# Patient Record
Sex: FEMALE | Race: WHITE | NOT HISPANIC OR LATINO | Employment: OTHER | URBAN - NONMETROPOLITAN AREA
[De-identification: names, ages, dates, MRNs, and addresses within clinical notes are randomized per-mention and may not be internally consistent; named-entity substitution may affect disease eponyms.]

---

## 2017-03-03 RX ORDER — CLOPIDOGREL BISULFATE 75 MG/1
TABLET ORAL
Qty: 30 TABLET | Refills: 6 | Status: SHIPPED | OUTPATIENT
Start: 2017-03-03 | End: 2017-09-17 | Stop reason: SDUPTHER

## 2017-04-12 ENCOUNTER — OFFICE VISIT (OUTPATIENT)
Dept: FAMILY MEDICINE CLINIC | Facility: CLINIC | Age: 77
End: 2017-04-12

## 2017-04-12 ENCOUNTER — APPOINTMENT (OUTPATIENT)
Dept: LAB | Facility: HOSPITAL | Age: 77
End: 2017-04-12

## 2017-04-12 VITALS
HEART RATE: 61 BPM | WEIGHT: 118 LBS | DIASTOLIC BLOOD PRESSURE: 72 MMHG | SYSTOLIC BLOOD PRESSURE: 122 MMHG | OXYGEN SATURATION: 100 % | BODY MASS INDEX: 23.05 KG/M2

## 2017-04-12 DIAGNOSIS — M25.572 CHRONIC PAIN OF BOTH ANKLES: ICD-10-CM

## 2017-04-12 DIAGNOSIS — M79.671 PAIN IN BOTH FEET: ICD-10-CM

## 2017-04-12 DIAGNOSIS — M79.672 PAIN IN BOTH FEET: ICD-10-CM

## 2017-04-12 DIAGNOSIS — M25.571 CHRONIC PAIN OF BOTH ANKLES: ICD-10-CM

## 2017-04-12 DIAGNOSIS — G89.29 CHRONIC PAIN OF BOTH ANKLES: ICD-10-CM

## 2017-04-12 DIAGNOSIS — E78.00 PURE HYPERCHOLESTEROLEMIA: ICD-10-CM

## 2017-04-12 DIAGNOSIS — I15.0 RENOVASCULAR HYPERTENSION: ICD-10-CM

## 2017-04-12 DIAGNOSIS — Z13.820 SCREENING FOR OSTEOPOROSIS: ICD-10-CM

## 2017-04-12 DIAGNOSIS — M54.6 ACUTE RIGHT-SIDED THORACIC BACK PAIN: Primary | ICD-10-CM

## 2017-04-12 DIAGNOSIS — Z23 PNEUMOCOCCAL VACCINATION ADMINISTERED AT CURRENT VISIT: ICD-10-CM

## 2017-04-12 DIAGNOSIS — N18.30 CHRONIC KIDNEY DISEASE, STAGE 3 (HCC): ICD-10-CM

## 2017-04-12 LAB
ALBUMIN SERPL-MCNC: 4.3 G/DL (ref 3.4–4.8)
ALBUMIN/GLOB SERPL: 1.5 G/DL (ref 1.1–1.8)
ALP SERPL-CCNC: 98 U/L (ref 38–126)
ALT SERPL W P-5'-P-CCNC: 31 U/L (ref 9–52)
ANION GAP SERPL CALCULATED.3IONS-SCNC: 12 MMOL/L (ref 5–15)
ARTICHOKE IGE QN: 47 MG/DL (ref 1–129)
AST SERPL-CCNC: 76 U/L (ref 14–36)
BILIRUB SERPL-MCNC: 0.7 MG/DL (ref 0.2–1.3)
BUN BLD-MCNC: 24 MG/DL (ref 7–21)
BUN/CREAT SERPL: 19.7 (ref 7–25)
CALCIUM SPEC-SCNC: 9.6 MG/DL (ref 8.4–10.2)
CHLORIDE SERPL-SCNC: 100 MMOL/L (ref 95–110)
CHOLEST SERPL-MCNC: 149 MG/DL (ref 0–199)
CO2 SERPL-SCNC: 28 MMOL/L (ref 22–31)
CREAT BLD-MCNC: 1.22 MG/DL (ref 0.5–1)
GFR SERPL CREATININE-BSD FRML MDRD: 43 ML/MIN/1.73 (ref 39–90)
GLOBULIN UR ELPH-MCNC: 2.9 GM/DL (ref 2.3–3.5)
GLUCOSE BLD-MCNC: 106 MG/DL (ref 60–100)
HDLC SERPL-MCNC: 59 MG/DL (ref 60–200)
LDLC/HDLC SERPL: 1.12 {RATIO} (ref 0–3.22)
POTASSIUM BLD-SCNC: 3.7 MMOL/L (ref 3.5–5.1)
PROT SERPL-MCNC: 7.2 G/DL (ref 6.3–8.6)
SODIUM BLD-SCNC: 140 MMOL/L (ref 137–145)
TRIGL SERPL-MCNC: 119 MG/DL (ref 20–199)

## 2017-04-12 PROCEDURE — G0009 ADMIN PNEUMOCOCCAL VACCINE: HCPCS | Performed by: FAMILY MEDICINE

## 2017-04-12 PROCEDURE — 80053 COMPREHEN METABOLIC PANEL: CPT | Performed by: FAMILY MEDICINE

## 2017-04-12 PROCEDURE — 90732 PPSV23 VACC 2 YRS+ SUBQ/IM: CPT | Performed by: FAMILY MEDICINE

## 2017-04-12 PROCEDURE — 36415 COLL VENOUS BLD VENIPUNCTURE: CPT | Performed by: FAMILY MEDICINE

## 2017-04-12 PROCEDURE — 99214 OFFICE O/P EST MOD 30 MIN: CPT | Performed by: FAMILY MEDICINE

## 2017-04-12 PROCEDURE — 80061 LIPID PANEL: CPT | Performed by: FAMILY MEDICINE

## 2017-04-12 RX ORDER — TORSEMIDE 10 MG/1
10 TABLET ORAL DAILY
Refills: 1 | COMMUNITY
Start: 2017-03-20 | End: 2019-12-02 | Stop reason: ALTCHOICE

## 2017-04-12 RX ORDER — AMLODIPINE BESYLATE 10 MG/1
10 TABLET ORAL DAILY
Refills: 6 | COMMUNITY
Start: 2017-03-20 | End: 2018-05-29 | Stop reason: SDUPTHER

## 2017-04-12 NOTE — PROGRESS NOTES
Subjective   Ilda Leon is a 77 y.o. female.     Hypertension   This is a chronic problem. The problem is unchanged. The problem is controlled. Associated symptoms include headaches and peripheral edema. Pertinent negatives include no chest pain, palpitations, PND or shortness of breath.   Joint Swelling   This is a chronic problem. The current episode started more than 1 month ago. The problem occurs constantly. Associated symptoms include congestion and headaches. Pertinent negatives include no chest pain, coughing, myalgias or sore throat. Joint swelling: ankles bilaterally.   Ankle Pain      Chronic Kidney Disease   This is a chronic problem. The current episode started more than 1 year ago. The problem has been unchanged. Associated symptoms include congestion and headaches. Pertinent negatives include no chest pain, coughing, myalgias or sore throat. Joint swelling: ankles bilaterally.   URI    This is a recurrent problem. The current episode started 1 to 4 weeks ago. The problem has been rapidly worsening. There has been no fever. Associated symptoms include congestion, headaches, rhinorrhea and sinus pain. Pertinent negatives include no chest pain, coughing, ear pain, sore throat or wheezing.   Back Pain   This is a recurrent problem. The current episode started more than 1 month ago. The problem has been waxing and waning since onset. The pain is present in the thoracic spine. The quality of the pain is described as aching. The pain is at a severity of 10/10. The pain is severe. The pain is the same all the time. Associated symptoms include headaches. Pertinent negatives include no chest pain.        The following portions of the patient's history were reviewed and updated as appropriate: allergies, current medications, past family history, past medical history, past social history, past surgical history and problem list.    Review of Systems   HENT: Positive for congestion, ear discharge, facial  swelling, postnasal drip and rhinorrhea. Negative for ear pain, hearing loss and sore throat.    Respiratory: Negative for cough, shortness of breath and wheezing.    Cardiovascular: Negative for chest pain, palpitations and PND.   Musculoskeletal: Positive for back pain. Negative for myalgias. Joint swelling: ankles bilaterally.   Neurological: Positive for headaches.       Objective   Physical Exam   Constitutional: She is oriented to person, place, and time. She appears well-developed and well-nourished. No distress.   HENT:   Head: Normocephalic and atraumatic.   Right Ear: No lacerations. No drainage, swelling or tenderness. No foreign bodies. Tympanic membrane is not injected. No middle ear effusion.   Left Ear: No lacerations. No drainage, swelling or tenderness. No foreign bodies. Tympanic membrane is not injected.  No middle ear effusion. No decreased hearing is noted.   Cardiovascular: Normal rate, regular rhythm and normal heart sounds.  Exam reveals no gallop.    No murmur heard.  Pulmonary/Chest: Effort normal and breath sounds normal. No respiratory distress. She has no wheezes. She has no rales. She exhibits no tenderness.   Musculoskeletal: She exhibits edema.   Neurological: She is alert and oriented to person, place, and time.   Skin: Skin is warm and dry. She is not diaphoretic.   Psychiatric: She has a normal mood and affect. Her behavior is normal. Judgment and thought content normal.   Nursing note and vitals reviewed.      Assessment/Plan   Problems Addressed this Visit        Cardiovascular and Mediastinum    Hyperlipidemia    Relevant Orders    Lipid Panel    Renovascular hypertension    Relevant Medications    amLODIPine (NORVASC) 10 MG tablet    torsemide (DEMADEX) 10 MG tablet    Other Relevant Orders    Comprehensive Metabolic Panel       Nervous and Auditory    Pain in both feet    Relevant Orders    XR Foot 3+ View Right (In Office)    XR Foot 3+ View Left       Genitourinary     Chronic kidney disease, stage 3    Relevant Medications    torsemide (DEMADEX) 10 MG tablet       Other    Acute right-sided thoracic back pain - Primary    Relevant Orders    XR spine thoracic 3 vw    Chronic pain of both ankles    Relevant Orders    XR Ankle 3+ View Right    XR Ankle 3+ View Left

## 2017-04-13 ENCOUNTER — TELEPHONE (OUTPATIENT)
Dept: FAMILY MEDICINE CLINIC | Facility: CLINIC | Age: 77
End: 2017-04-13

## 2017-04-13 DIAGNOSIS — R74.01 ELEVATED AST (SGOT): Primary | ICD-10-CM

## 2017-04-13 NOTE — TELEPHONE ENCOUNTER
Called and spoke with patient.  Relayed all results and recommendations.  Pt stated understanding.  Orders for Hep Panel and liver US sent.

## 2017-04-13 NOTE — TELEPHONE ENCOUNTER
----- Message from Jorge Diana MD sent at 4/13/2017  4:01 PM CDT -----  Renal function stable.  Liver test are elevated.  Recommend a hepatitis panel and an ultrasound the liver.  Please arrange

## 2017-04-13 NOTE — PROGRESS NOTES
Renal function stable.  Liver test are elevated.  Recommend a hepatitis panel and an ultrasound the liver.  Please arrange

## 2017-04-19 ENCOUNTER — HOSPITAL ENCOUNTER (OUTPATIENT)
Dept: ULTRASOUND IMAGING | Facility: HOSPITAL | Age: 77
Discharge: HOME | End: 2017-04-19
Attending: FAMILY MEDICINE | Admitting: FAMILY MEDICINE

## 2017-04-19 DIAGNOSIS — R74.01 ELEVATED AST (SGOT): ICD-10-CM

## 2017-04-19 PROCEDURE — 76705 ECHO EXAM OF ABDOMEN: CPT

## 2017-04-20 ENCOUNTER — TELEPHONE (OUTPATIENT)
Dept: FAMILY MEDICINE CLINIC | Facility: CLINIC | Age: 77
End: 2017-04-20

## 2017-04-20 RX ORDER — ALENDRONATE SODIUM 70 MG/1
TABLET ORAL
Qty: 4 TABLET | Refills: 11 | Status: SHIPPED | OUTPATIENT
Start: 2017-04-20 | End: 2017-05-26 | Stop reason: ALTCHOICE

## 2017-04-20 RX ORDER — ALENDRONATE SODIUM 70 MG/1
70 TABLET ORAL
Qty: 4 TABLET | Refills: 11 | Status: SHIPPED | OUTPATIENT
Start: 2017-04-20 | End: 2017-04-20 | Stop reason: SDUPTHER

## 2017-04-20 NOTE — PROGRESS NOTES
Has osteoporosis.  Recommend Fosamax 70 mg 30 minutes before breakfast on an empty stomach with 8 ounces of water once a week.  He'll her not to lie down for 30 minutes after taking this medicine area. ASk if she has been  On fosamax before.

## 2017-04-20 NOTE — TELEPHONE ENCOUNTER
Pr Dr. Diana, Ms Leon has been called with her DEXA Scan Results & Recommendations  She has never taken Fosamax in the past.   Script sent to her pharmacy  Continue her other medications and follow-up as planned.       ----- Message from Jorge Diana MD sent at 4/20/2017  2:07 PM CDT -----  Has osteoporosis.  Recommend Fosamax 70 mg 30 minutes before breakfast on an empty stomach with 8 ounces of water once a week.  He'll her not to lie down for 30 minutes after taking this medicine area. ASk if she has been  On fosamax before.

## 2017-05-26 ENCOUNTER — OFFICE VISIT (OUTPATIENT)
Dept: PAIN MEDICINE | Facility: CLINIC | Age: 77
End: 2017-05-26

## 2017-05-26 VITALS
WEIGHT: 119.2 LBS | HEIGHT: 60 IN | SYSTOLIC BLOOD PRESSURE: 142 MMHG | DIASTOLIC BLOOD PRESSURE: 72 MMHG | BODY MASS INDEX: 23.4 KG/M2

## 2017-05-26 DIAGNOSIS — G50.0 TRIGEMINAL NEURALGIA OF RIGHT SIDE OF FACE: Primary | ICD-10-CM

## 2017-05-26 PROCEDURE — 99203 OFFICE O/P NEW LOW 30 MIN: CPT | Performed by: PAIN MEDICINE

## 2017-06-29 ENCOUNTER — OFFICE VISIT (OUTPATIENT)
Dept: OPHTHALMOLOGY | Facility: CLINIC | Age: 77
End: 2017-06-29

## 2017-06-29 DIAGNOSIS — Z96.1 PSEUDOPHAKIA: ICD-10-CM

## 2017-06-29 DIAGNOSIS — H25.012 CORTICAL AGE-RELATED CATARACT, LEFT: ICD-10-CM

## 2017-06-29 DIAGNOSIS — IMO0002 NUCLEAR CATARACT, LEFT: Primary | ICD-10-CM

## 2017-06-29 PROCEDURE — 99213 OFFICE O/P EST LOW 20 MIN: CPT | Performed by: OPHTHALMOLOGY

## 2017-06-29 NOTE — PROGRESS NOTES
Subjective   Ilda Leon is a 77 y.o. female.   Chief Complaint   Patient presents with   • Cataract   • Eye Exam   • Itchy Eye   • Pseudophakia     Right     HPI     Cataract   Laterality: left eye           Itchy Eye   Laterality: both eyes   Frequency: intermittently   Associated symptoms: tearing           Pseudophakia   Comments: Right       Last edited by Mitra Whiteside on 6/29/2017 10:59 AM. (History)          Review of Systems    Objective   Base Eye Exam     Visual Acuity (Snellen - Linear)      Right Left   Dist cc 20/30 20/50 -1   Near cc J2 J2       Correction:  Glasses      Tonometry (Applanation, 11:24 AM)      Right Left   Pressure 18 18         Pupils      Pupils   Right PERRL   Left PERRL         Visual Fields      Left Right   Result Full Full         Extraocular Movement      Right Left   Result Full Full         Dilation     Both eyes:  2.5% Dimitris Synephrine, 1.0% Mydriacyl @ 11:28 AM            Slit Lamp and Fundus Exam     External Exam      Right Left    External Normal Normal      Slit Lamp Exam      Right Left    Lids/Lashes Normal Normal    Conjunctiva/Sclera White and quiet White and quiet    Cornea Clear Clear    Anterior Chamber Deep and quiet Deep and quiet    Iris Round and reactive Round and reactive    Lens Posterior chamber intraocular lens 1+ Nuclear sclerosis, 1+ Cortical cataract    Vitreous Normal Normal      Fundus Exam      Right Left    Disc Normal Normal    Macula Normal Normal    Vessels Normal Normal    Periphery Normal Normal            Refraction     Wearing Rx      Sphere Cylinder Axis Add   Right -1.50 +1.75 178 +2.75   Left +0.25 +1.25 174 +2.75         Manifest Refraction      Sphere Cylinder Axis Dist Add   Right -1.50 +1.75 178 20/30 +2.75   Left +0.25 +1.25 174 20/40 +2.75         Final Rx      Sphere Cylinder Axis Add   Right -1.50 +1.75 178 +2.75   Left +0.25 +1.25 174 +2.75                   Assessment/Plan   Diagnoses and all orders for this  visit:    Nuclear cataract, left    Cortical age-related cataract, left    Pseudophakia      Plan:    Glasses Rx given per refraction  Recommend ophthalmology f/u one year/prn      No Follow-up on file.

## 2017-07-31 RX ORDER — LORATADINE 10 MG/1
TABLET ORAL
Qty: 10 TABLET | Refills: 2 | Status: SHIPPED | OUTPATIENT
Start: 2017-07-31 | End: 2017-08-28

## 2017-08-16 RX ORDER — ATORVASTATIN CALCIUM 10 MG/1
TABLET, FILM COATED ORAL
Qty: 90 TABLET | Refills: 2 | Status: SHIPPED | OUTPATIENT
Start: 2017-08-16 | End: 2018-05-13 | Stop reason: SDUPTHER

## 2017-08-28 ENCOUNTER — OFFICE VISIT (OUTPATIENT)
Dept: FAMILY MEDICINE CLINIC | Facility: CLINIC | Age: 77
End: 2017-08-28

## 2017-08-28 VITALS
OXYGEN SATURATION: 99 % | DIASTOLIC BLOOD PRESSURE: 70 MMHG | HEART RATE: 73 BPM | HEIGHT: 60 IN | BODY MASS INDEX: 23.75 KG/M2 | WEIGHT: 121 LBS | SYSTOLIC BLOOD PRESSURE: 122 MMHG

## 2017-08-28 DIAGNOSIS — I10 ESSENTIAL HYPERTENSION: ICD-10-CM

## 2017-08-28 DIAGNOSIS — M81.0 OSTEOPOROSIS: Primary | ICD-10-CM

## 2017-08-28 DIAGNOSIS — J30.2 SEASONAL ALLERGIC RHINITIS, UNSPECIFIED ALLERGIC RHINITIS TRIGGER: ICD-10-CM

## 2017-08-28 DIAGNOSIS — N18.30 CHRONIC KIDNEY DISEASE, STAGE 3 (HCC): ICD-10-CM

## 2017-08-28 PROCEDURE — 99214 OFFICE O/P EST MOD 30 MIN: CPT | Performed by: FAMILY MEDICINE

## 2017-08-28 RX ORDER — FLUTICASONE PROPIONATE 50 MCG
2 SPRAY, SUSPENSION (ML) NASAL DAILY
Qty: 1 EACH | Refills: 11 | Status: SHIPPED | OUTPATIENT
Start: 2017-08-28 | End: 2017-09-27

## 2017-08-28 NOTE — PROGRESS NOTES
Subjective   Ilda Leon is a 77 y.o. female.     Hypertension   This is a chronic problem. The problem is unchanged. The problem is controlled. Associated symptoms include peripheral edema. Pertinent negatives include no palpitations, PND or shortness of breath.   Chronic Kidney Disease   This is a chronic problem. The current episode started more than 1 year ago. The problem has been unchanged. Associated symptoms include congestion.   Back Pain   This is a recurrent problem. The current episode started more than 1 month ago. The problem has been waxing and waning since onset. The pain is present in the thoracic spine. The quality of the pain is described as aching. The pain is at a severity of 10/10. The pain is severe. The pain is the same all the time.   URI    This is a recurrent problem. The current episode started more than 1 month ago. The problem has been waxing and waning. There has been no fever. Associated symptoms include congestion and sneezing. Pertinent negatives include no rhinorrhea, sinus pain or wheezing.        The following portions of the patient's history were reviewed and updated as appropriate: allergies, current medications, past family history, past medical history, past social history, past surgical history and problem list.    Review of Systems   HENT: Positive for congestion, ear discharge, facial swelling, postnasal drip and sneezing. Negative for hearing loss and rhinorrhea.    Respiratory: Negative for shortness of breath and wheezing.    Cardiovascular: Negative for palpitations and PND.   Musculoskeletal: Positive for back pain.       Objective   Physical Exam   Constitutional: She is oriented to person, place, and time. She appears well-developed and well-nourished. No distress.   HENT:   Head: Normocephalic and atraumatic.   Right Ear: No lacerations. No drainage, swelling or tenderness. No foreign bodies. Tympanic membrane is not injected. No middle ear effusion.   Left  Ear: No lacerations. No drainage, swelling or tenderness. No foreign bodies. Tympanic membrane is not injected.  No middle ear effusion. No decreased hearing is noted.   Nose: Mucosal edema and rhinorrhea present.   Mouth/Throat: Uvula is midline, oropharynx is clear and moist and mucous membranes are normal.   Cardiovascular: Normal rate, regular rhythm and normal heart sounds.  Exam reveals no gallop.    No murmur heard.  Pulmonary/Chest: Effort normal and breath sounds normal. No respiratory distress. She has no wheezes. She has no rales. She exhibits no tenderness.   Musculoskeletal: She exhibits edema.   Neurological: She is alert and oriented to person, place, and time.   Skin: Skin is warm and dry. She is not diaphoretic.   Psychiatric: She has a normal mood and affect. Her behavior is normal. Judgment and thought content normal.   Nursing note and vitals reviewed.      Assessment/Plan   Problems Addressed this Visit        Musculoskeletal and Integument    Osteoporosis declined fosamax - Primary    Relevant Medications    denosumab (PROLIA) syringe 60 mg       Genitourinary    Chronic kidney disease, stage 3      Other Visit Diagnoses     Essential hypertension        Seasonal allergic rhinitis, unspecified allergic rhinitis trigger

## 2017-09-13 ENCOUNTER — INFUSION (OUTPATIENT)
Dept: ONCOLOGY | Facility: HOSPITAL | Age: 77
End: 2017-09-13

## 2017-09-13 DIAGNOSIS — M81.0 OSTEOPOROSIS: Primary | ICD-10-CM

## 2017-09-13 PROCEDURE — 96372 THER/PROPH/DIAG INJ SC/IM: CPT | Performed by: HOSPITALIST

## 2017-09-13 PROCEDURE — 25010000002 DENOSUMAB 60 MG/ML SOLUTION: Performed by: FAMILY MEDICINE

## 2017-09-13 RX ADMIN — DENOSUMAB 60 MG: 60 INJECTION SUBCUTANEOUS at 10:40

## 2017-09-18 RX ORDER — CLOPIDOGREL BISULFATE 75 MG/1
TABLET ORAL
Qty: 30 TABLET | Refills: 6 | Status: SHIPPED | OUTPATIENT
Start: 2017-09-18 | End: 2018-04-07 | Stop reason: SDUPTHER

## 2017-11-28 ENCOUNTER — OFFICE VISIT (OUTPATIENT)
Dept: FAMILY MEDICINE CLINIC | Facility: CLINIC | Age: 77
End: 2017-11-28

## 2017-11-28 ENCOUNTER — LAB (OUTPATIENT)
Dept: LAB | Facility: HOSPITAL | Age: 77
End: 2017-11-28

## 2017-11-28 VITALS
HEIGHT: 60 IN | SYSTOLIC BLOOD PRESSURE: 120 MMHG | BODY MASS INDEX: 23.71 KG/M2 | DIASTOLIC BLOOD PRESSURE: 70 MMHG | HEART RATE: 64 BPM | OXYGEN SATURATION: 99 % | WEIGHT: 120.8 LBS

## 2017-11-28 DIAGNOSIS — R74.01 ELEVATED AST (SGOT): ICD-10-CM

## 2017-11-28 DIAGNOSIS — E78.00 PURE HYPERCHOLESTEROLEMIA: ICD-10-CM

## 2017-11-28 DIAGNOSIS — J30.1 CHRONIC SEASONAL ALLERGIC RHINITIS DUE TO POLLEN: ICD-10-CM

## 2017-11-28 DIAGNOSIS — N18.30 CHRONIC KIDNEY DISEASE, STAGE 3 (HCC): ICD-10-CM

## 2017-11-28 DIAGNOSIS — I15.0 RENOVASCULAR HYPERTENSION: ICD-10-CM

## 2017-11-28 DIAGNOSIS — Z12.39 SCREENING FOR BREAST CANCER: Primary | ICD-10-CM

## 2017-11-28 LAB
ALBUMIN SERPL-MCNC: 4.3 G/DL (ref 3.4–4.8)
ALBUMIN/GLOB SERPL: 1.3 G/DL (ref 1.1–1.8)
ALP SERPL-CCNC: 80 U/L (ref 38–126)
ALT SERPL W P-5'-P-CCNC: 29 U/L (ref 9–52)
ANION GAP SERPL CALCULATED.3IONS-SCNC: 12 MMOL/L (ref 5–15)
ARTICHOKE IGE QN: 47 MG/DL (ref 1–129)
AST SERPL-CCNC: 64 U/L (ref 14–36)
BILIRUB SERPL-MCNC: 0.6 MG/DL (ref 0.2–1.3)
BUN BLD-MCNC: 24 MG/DL (ref 7–21)
BUN/CREAT SERPL: 17.5 (ref 7–25)
CALCIUM SPEC-SCNC: 9 MG/DL (ref 8.4–10.2)
CHLORIDE SERPL-SCNC: 102 MMOL/L (ref 95–110)
CHOLEST SERPL-MCNC: 131 MG/DL (ref 0–199)
CO2 SERPL-SCNC: 28 MMOL/L (ref 22–31)
CREAT BLD-MCNC: 1.37 MG/DL (ref 0.5–1)
GFR SERPL CREATININE-BSD FRML MDRD: 37 ML/MIN/1.73 (ref 39–90)
GLOBULIN UR ELPH-MCNC: 3.2 GM/DL (ref 2.3–3.5)
GLUCOSE BLD-MCNC: 100 MG/DL (ref 60–100)
HDLC SERPL-MCNC: 54 MG/DL (ref 60–200)
LDLC/HDLC SERPL: 1.04 {RATIO} (ref 0–3.22)
PHOSPHATE SERPL-MCNC: 2.9 MG/DL (ref 2.4–4.4)
POTASSIUM BLD-SCNC: 3.5 MMOL/L (ref 3.5–5.1)
PROT SERPL-MCNC: 7.5 G/DL (ref 6.3–8.6)
SODIUM BLD-SCNC: 142 MMOL/L (ref 137–145)
TRIGL SERPL-MCNC: 105 MG/DL (ref 20–199)

## 2017-11-28 PROCEDURE — 87340 HEPATITIS B SURFACE AG IA: CPT

## 2017-11-28 PROCEDURE — 86708 HEPATITIS A ANTIBODY: CPT

## 2017-11-28 PROCEDURE — 80061 LIPID PANEL: CPT | Performed by: FAMILY MEDICINE

## 2017-11-28 PROCEDURE — 86803 HEPATITIS C AB TEST: CPT

## 2017-11-28 PROCEDURE — 86706 HEP B SURFACE ANTIBODY: CPT

## 2017-11-28 PROCEDURE — 99214 OFFICE O/P EST MOD 30 MIN: CPT | Performed by: FAMILY MEDICINE

## 2017-11-28 PROCEDURE — 84100 ASSAY OF PHOSPHORUS: CPT

## 2017-11-28 PROCEDURE — 36415 COLL VENOUS BLD VENIPUNCTURE: CPT

## 2017-11-28 PROCEDURE — 80053 COMPREHEN METABOLIC PANEL: CPT | Performed by: FAMILY MEDICINE

## 2017-11-28 PROCEDURE — 86704 HEP B CORE ANTIBODY TOTAL: CPT

## 2017-11-28 NOTE — PROGRESS NOTES
Subjective   Ilda Leon is a 77 y.o. female.     Arthritis     Hypertension   This is a chronic problem. The current episode started more than 1 year ago. The problem is unchanged. The problem is controlled. Associated symptoms include peripheral edema. Pertinent negatives include no chest pain, orthopnea, palpitations, PND or shortness of breath.   Chronic Kidney Disease   This is a chronic problem. The current episode started more than 1 year ago. The problem has been unchanged. Associated symptoms include myalgias. Pertinent negatives include no chest pain.   Back Pain   This is a recurrent problem. The current episode started more than 1 month ago. The problem has been waxing and waning since onset. The pain is present in the thoracic spine. The quality of the pain is described as aching. The pain is at a severity of 10/10. The pain is severe. The pain is the same all the time. Pertinent negatives include no chest pain.   Hyperlipidemia   Associated symptoms include myalgias. Pertinent negatives include no chest pain or shortness of breath.        The following portions of the patient's history were reviewed and updated as appropriate: allergies, current medications, past family history, past medical history, past social history, past surgical history and problem list.    Review of Systems   HENT: Positive for postnasal drip.    Respiratory: Negative for shortness of breath.    Cardiovascular: Negative for chest pain, palpitations, orthopnea and PND.   Musculoskeletal: Positive for arthritis, back pain and myalgias.       Objective   Physical Exam   Constitutional: She is oriented to person, place, and time. She appears well-developed and well-nourished. No distress.   HENT:   Head: Normocephalic and atraumatic.   Right Ear: No lacerations. No drainage, swelling or tenderness. No foreign bodies. Tympanic membrane is not injected. No middle ear effusion.   Left Ear: No lacerations. No drainage, swelling or  tenderness. No foreign bodies. Tympanic membrane is not injected.  No middle ear effusion. No decreased hearing is noted.   Nose: Mucosal edema and rhinorrhea present.   Mouth/Throat: Uvula is midline, oropharynx is clear and moist and mucous membranes are normal.   Cardiovascular: Normal rate, regular rhythm, normal heart sounds and intact distal pulses.  Exam reveals no gallop.    No murmur heard.  Pulmonary/Chest: Effort normal and breath sounds normal. No respiratory distress. She has no wheezes. She has no rales. She exhibits no tenderness.   Musculoskeletal: She exhibits edema.   Neurological: She is alert and oriented to person, place, and time.   Skin: Skin is warm and dry. She is not diaphoretic.   Psychiatric: She has a normal mood and affect. Her behavior is normal. Judgment and thought content normal.   Nursing note and vitals reviewed.      Assessment/Plan   Problems Addressed this Visit        Cardiovascular and Mediastinum    Hyperlipidemia    Renovascular hypertension       Genitourinary    Chronic kidney disease, stage 3      Other Visit Diagnoses     Screening for breast cancer    -  Primary    Relevant Orders    Mammo Screening Digital Tomosynthesis Bilateral With CAD    Chronic seasonal allergic rhinitis due to pollen

## 2017-11-29 LAB
HAV AB SER QL IA: POSITIVE
HBV CORE AB SER DONR QL IA: NEGATIVE
HBV SURFACE AB SER QL: <5 (ref 0–4.99)
HBV SURFACE AB SER RIA-ACNC: ABNORMAL
HBV SURFACE AG SERPL QL IA: NEGATIVE
HCV AB SER DONR QL: NEGATIVE

## 2017-11-30 ENCOUNTER — TELEPHONE (OUTPATIENT)
Dept: FAMILY MEDICINE CLINIC | Facility: CLINIC | Age: 77
End: 2017-11-30

## 2017-11-30 NOTE — PROGRESS NOTES
Pr Dr. Diana, Ms. Leon has been called with her recent lab results & recommendations.  Continue her current medications and follow-up as planned or sooner if any problems.

## 2017-11-30 NOTE — TELEPHONE ENCOUNTER
Pr Dr. Diana, Ms. Leon has been called with her recent lab results & recommendations.  Continue her current medications and follow-up as planned or sooner if any problems.      ----- Message from Jorge Diana MD sent at 11/29/2017  1:35 PM CST -----  Renal and hepatic function stable.  Cholesterol okay

## 2018-03-01 RX ORDER — ERGOCALCIFEROL 1.25 MG/1
CAPSULE ORAL
Qty: 4 CAPSULE | Refills: 3 | Status: SHIPPED | OUTPATIENT
Start: 2018-03-01 | End: 2018-11-29

## 2018-04-10 RX ORDER — CLOPIDOGREL BISULFATE 75 MG/1
TABLET ORAL
Qty: 30 TABLET | Refills: 6 | Status: SHIPPED | OUTPATIENT
Start: 2018-04-10 | End: 2018-10-11 | Stop reason: SDUPTHER

## 2018-05-14 RX ORDER — ATORVASTATIN CALCIUM 10 MG/1
TABLET, FILM COATED ORAL
Qty: 90 TABLET | Refills: 2 | Status: SHIPPED | OUTPATIENT
Start: 2018-05-14 | End: 2018-12-05 | Stop reason: SDUPTHER

## 2018-05-29 ENCOUNTER — OFFICE VISIT (OUTPATIENT)
Dept: FAMILY MEDICINE CLINIC | Facility: CLINIC | Age: 78
End: 2018-05-29

## 2018-05-29 ENCOUNTER — APPOINTMENT (OUTPATIENT)
Dept: LAB | Facility: HOSPITAL | Age: 78
End: 2018-05-29

## 2018-05-29 VITALS
DIASTOLIC BLOOD PRESSURE: 72 MMHG | HEART RATE: 71 BPM | SYSTOLIC BLOOD PRESSURE: 136 MMHG | HEIGHT: 60 IN | BODY MASS INDEX: 23.78 KG/M2 | WEIGHT: 121.1 LBS | OXYGEN SATURATION: 99 %

## 2018-05-29 DIAGNOSIS — J30.1 CHRONIC SEASONAL ALLERGIC RHINITIS DUE TO POLLEN: ICD-10-CM

## 2018-05-29 DIAGNOSIS — E78.00 PURE HYPERCHOLESTEROLEMIA: Primary | ICD-10-CM

## 2018-05-29 DIAGNOSIS — N18.30 CHRONIC KIDNEY DISEASE, STAGE 3 (HCC): ICD-10-CM

## 2018-05-29 DIAGNOSIS — I15.0 RENOVASCULAR HYPERTENSION: ICD-10-CM

## 2018-05-29 LAB
ALBUMIN SERPL-MCNC: 4.2 G/DL (ref 3.4–4.8)
ALBUMIN/GLOB SERPL: 1.3 G/DL (ref 1.1–1.8)
ALP SERPL-CCNC: 74 U/L (ref 38–126)
ALT SERPL W P-5'-P-CCNC: 28 U/L (ref 9–52)
ANION GAP SERPL CALCULATED.3IONS-SCNC: 11 MMOL/L (ref 5–15)
ARTICHOKE IGE QN: 51 MG/DL (ref 1–129)
AST SERPL-CCNC: 81 U/L (ref 14–36)
BILIRUB SERPL-MCNC: 0.6 MG/DL (ref 0.2–1.3)
BUN BLD-MCNC: 33 MG/DL (ref 7–21)
BUN/CREAT SERPL: 25.2 (ref 7–25)
CALCIUM SPEC-SCNC: 9.4 MG/DL (ref 8.4–10.2)
CHLORIDE SERPL-SCNC: 100 MMOL/L (ref 95–110)
CHOLEST SERPL-MCNC: 146 MG/DL (ref 0–199)
CO2 SERPL-SCNC: 31 MMOL/L (ref 22–31)
CREAT BLD-MCNC: 1.31 MG/DL (ref 0.5–1)
GFR SERPL CREATININE-BSD FRML MDRD: 39 ML/MIN/1.73 (ref 39–90)
GLOBULIN UR ELPH-MCNC: 3.3 GM/DL (ref 2.3–3.5)
GLUCOSE BLD-MCNC: 93 MG/DL (ref 60–100)
HDLC SERPL-MCNC: 49 MG/DL (ref 60–200)
LDLC/HDLC SERPL: 1.42 {RATIO} (ref 0–3.22)
POTASSIUM BLD-SCNC: 3.9 MMOL/L (ref 3.5–5.1)
PROT SERPL-MCNC: 7.5 G/DL (ref 6.3–8.6)
SODIUM BLD-SCNC: 142 MMOL/L (ref 137–145)
TRIGL SERPL-MCNC: 138 MG/DL (ref 20–199)

## 2018-05-29 PROCEDURE — 36415 COLL VENOUS BLD VENIPUNCTURE: CPT | Performed by: FAMILY MEDICINE

## 2018-05-29 PROCEDURE — 96372 THER/PROPH/DIAG INJ SC/IM: CPT | Performed by: FAMILY MEDICINE

## 2018-05-29 PROCEDURE — 80061 LIPID PANEL: CPT | Performed by: FAMILY MEDICINE

## 2018-05-29 PROCEDURE — 99214 OFFICE O/P EST MOD 30 MIN: CPT | Performed by: FAMILY MEDICINE

## 2018-05-29 PROCEDURE — 80053 COMPREHEN METABOLIC PANEL: CPT | Performed by: FAMILY MEDICINE

## 2018-05-29 RX ORDER — AMLODIPINE BESYLATE 10 MG/1
10 TABLET ORAL DAILY
Qty: 90 TABLET | Refills: 2 | Status: SHIPPED | OUTPATIENT
Start: 2018-05-29 | End: 2018-05-29 | Stop reason: SDUPTHER

## 2018-05-29 RX ORDER — AMLODIPINE BESYLATE 10 MG/1
10 TABLET ORAL DAILY
Qty: 90 TABLET | Refills: 3 | Status: SHIPPED | OUTPATIENT
Start: 2018-05-29 | End: 2019-08-17 | Stop reason: SDUPTHER

## 2018-05-29 RX ORDER — TRIAMCINOLONE ACETONIDE 40 MG/ML
80 INJECTION, SUSPENSION INTRA-ARTICULAR; INTRAMUSCULAR ONCE
Status: COMPLETED | OUTPATIENT
Start: 2018-05-29 | End: 2018-05-29

## 2018-05-29 RX ADMIN — TRIAMCINOLONE ACETONIDE 80 MG: 40 INJECTION, SUSPENSION INTRA-ARTICULAR; INTRAMUSCULAR at 10:15

## 2018-05-29 NOTE — PROGRESS NOTES
Subjective   Ilda Leon is a 78 y.o. female.     Hyperlipidemia   Associated symptoms include myalgias. Pertinent negatives include no shortness of breath.   Arthritis   Presents for follow-up visit. She complains of pain and joint swelling. She reports no stiffness or joint warmth. The symptoms have been stable. Affected locations include the right ankle. Her pain is at a severity of 7/10.   Hypertension   This is a chronic problem. The current episode started more than 1 year ago. The problem is unchanged. The problem is controlled. Associated symptoms include peripheral edema. Pertinent negatives include no orthopnea, palpitations, PND or shortness of breath.   Chronic Kidney Disease   This is a chronic problem. The current episode started more than 1 year ago. The problem has been unchanged. Associated symptoms include congestion, joint swelling and myalgias.        The following portions of the patient's history were reviewed and updated as appropriate: allergies, current medications, past family history, past medical history, past social history, past surgical history and problem list.    Review of Systems   HENT: Positive for congestion, nosebleeds, postnasal drip and rhinorrhea.    Respiratory: Negative for shortness of breath.    Cardiovascular: Negative for palpitations, orthopnea and PND.   Musculoskeletal: Positive for arthritis, joint swelling and myalgias. Negative for stiffness.       Objective   Physical Exam   Constitutional: She is oriented to person, place, and time. She appears well-developed and well-nourished. No distress.   HENT:   Head: Normocephalic and atraumatic.   Right Ear: No lacerations. No drainage, swelling or tenderness. No foreign bodies. Tympanic membrane is not injected. No middle ear effusion.   Left Ear: No lacerations. No drainage, swelling or tenderness. No foreign bodies. Tympanic membrane is not injected.  No middle ear effusion. No decreased hearing is noted.   Nose:  Mucosal edema and rhinorrhea present.   Mouth/Throat: Uvula is midline, oropharynx is clear and moist and mucous membranes are normal.   Cardiovascular: Normal rate, regular rhythm, normal heart sounds and intact distal pulses.  Exam reveals no gallop.    No murmur heard.  Pulmonary/Chest: Effort normal and breath sounds normal. No respiratory distress. She has no wheezes. She has no rales. She exhibits no tenderness.   Musculoskeletal: She exhibits edema.   Neurological: She is alert and oriented to person, place, and time.   Skin: Skin is warm and dry. She is not diaphoretic.   Psychiatric: She has a normal mood and affect. Her behavior is normal. Judgment and thought content normal.   Nursing note and vitals reviewed.      Assessment/Plan   Problems Addressed this Visit        Cardiovascular and Mediastinum    Hyperlipidemia - Primary    Renovascular hypertension    Relevant Medications    amLODIPine (NORVASC) 10 MG tablet       Genitourinary    Chronic kidney disease, stage 3      Other Visit Diagnoses     Chronic seasonal allergic rhinitis due to pollen

## 2018-06-01 ENCOUNTER — TELEPHONE (OUTPATIENT)
Dept: FAMILY MEDICINE CLINIC | Facility: CLINIC | Age: 78
End: 2018-06-01

## 2018-06-01 NOTE — TELEPHONE ENCOUNTER
-Pr Dr. Diana, Ms. Leon has been called with her recent lab results & recommendations.  Continue her current medications and follow-up as planned or sooner if any problems.      ---- Message from Jorge Diana MD sent at 5/29/2018  4:20 PM CDT -----  Renal and hepatic function stable

## 2018-07-09 DIAGNOSIS — I70.1 RENAL ARTERY STENOSIS (HCC): Primary | ICD-10-CM

## 2018-07-10 ENCOUNTER — OFFICE VISIT (OUTPATIENT)
Dept: CARDIAC SURGERY | Facility: CLINIC | Age: 78
End: 2018-07-10

## 2018-07-10 VITALS
DIASTOLIC BLOOD PRESSURE: 78 MMHG | OXYGEN SATURATION: 99 % | HEIGHT: 60 IN | BODY MASS INDEX: 23.36 KG/M2 | WEIGHT: 119 LBS | HEART RATE: 94 BPM | SYSTOLIC BLOOD PRESSURE: 155 MMHG | TEMPERATURE: 97.7 F

## 2018-07-10 DIAGNOSIS — I70.1 RENAL ARTERY STENOSIS (HCC): Primary | ICD-10-CM

## 2018-07-10 PROCEDURE — 99212 OFFICE O/P EST SF 10 MIN: CPT | Performed by: THORACIC SURGERY (CARDIOTHORACIC VASCULAR SURGERY)

## 2018-07-10 RX ORDER — ACETAMINOPHEN 325 MG/1
650 TABLET ORAL EVERY 6 HOURS PRN
COMMUNITY

## 2018-07-10 NOTE — PROGRESS NOTES
Ilda Leon  1940            78 y.o.      7/10/2018    Chief Complaint   Patient presents with   • Renal Artery Stenosis     1 year follow up   PCP MARIA R GONCALVES MD    Patient returns for follow up stent right renal artery 5-18-06 Know atrophy left kidney.    Recent laboratory work 5-29-18 straight to be within 33, creatinine 1.31, GFR 39.  Carotid duplex scan 11/20/2015 demonstrated 0-15% stenosis bilaterally and with antegrade flow in both vertebral arteries        HPI    Blood pressure  measurements at home recently recorded are normal    ROS       Current Outpatient Prescriptions:   •  acetaminophen (TYLENOL) 325 MG tablet, Take 650 mg by mouth Every 6 (Six) Hours As Needed for Mild Pain ., Disp: , Rfl:   •  amLODIPine (NORVASC) 10 MG tablet, Take 1 tablet by mouth Daily., Disp: 90 tablet, Rfl: 3  •  atorvastatin (LIPITOR) 10 MG tablet, TAKE 1 TABLET BY MOUTH DAILY., Disp: 90 tablet, Rfl: 2  •  clopidogrel (PLAVIX) 75 MG tablet, TAKE 1 TABLET BY MOUTH EVERY DAY, Disp: 30 tablet, Rfl: 6  •  metoprolol tartrate (LOPRESSOR) 25 MG tablet, TAKE 1 TABLET TWICE A DAY, Disp: 180 tablet, Rfl: 2  •  torsemide (DEMADEX) 10 MG tablet, Take 10 mg by mouth Daily., Disp: , Rfl: 1  •  Unable to find, multivits,therap w-fe,hematin oral, Disp: , Rfl:   •  vitamin D (ERGOCALCIFEROL) 90081 units capsule capsule, TAKE ONE CAPSULE BY MOUTH ONCE MONTHLY, Disp: 4 capsule, Rfl: 3    Current Facility-Administered Medications:   •  denosumab (PROLIA) syringe 60 mg, 60 mg, Subcutaneous, Once, Maria R Goncalves MD    The following portions of the patient's history were reviewed and updated as appropriate: allergies, current medications, past family history, past medical history, past social history, past surgical history and problem list.        Past Surgical History:   Procedure Laterality Date   • BREAST BIOPSY     • BREAST LUMPECTOMY Right 1991   • CATARACT EXTRACTION WITH INTRAOCULAR LENS IMPLANT Right 2008   • CHOLECYSTECTOMY   "03/04/2008    Laparoscopic cholecystectomy with operative cholangiogram. Acute cholecystitis   • COLONOSCOPY  2006   • INJECTION OF MEDICATION  10/03/2011    Kenalog (1)    • MAMMO BILATERAL  02/28/2015    Screening   • RENAL ARTERY STENT Right 05/18/2006    5x15mm balloon expandable stent x2            Physical Exam  /78 (BP Location: Left arm)   Pulse 94   Temp 97.7 °F (36.5 °C) (Oral)   Ht 152.4 cm (60\")   Wt 54 kg (119 lb)   SpO2 99%   BMI 23.24 kg/m²     HEENT: Supple no masses or bruits    CHEST: Clear to auscultation    HEART: Regular rate and rhythm    ABDOMEN: Nontender    EXTREMITIES: Dorsalis pedis pulses easily palpable    VASCULAR LAB STUDIES:RENAL ARTERY ULTRASOUND (7-10-18)    Normal RIGHT renal artery    Know atrophy right kidney    Right kidney normal in size    Carotid duplex scan (11-20-15) 0-15% Right, Left Antegrade flow both vertebral arteries                RADIOLOGY:      Renal artery stenosis (CMS/HCC) [I70.1]    IMPRESSION:    Widely patent right renal artery stent placed 5-18-06  Normal carotid duplex scan 2015  Know atrophy left kidney  Renal function remains adequate              Assessment/Plan  Ilda was seen today for renal artery stenosis.    Diagnoses and all orders for this visit:    Renal artery stenosis (CMS/HCC)                  Return in about 1 year (around 7/10/2019), or Right renal artery duplex scan.            Jack L. Hamman, MD  7/10/2018  "

## 2018-07-12 LAB
BH CV VAS KIDNEY HEIGHT LEFT: 1.53 CM
BH CV VAS RENAL AORTIC MID EDV: 21.2 CM/S
BH CV VAS RENAL AORTIC MID PSV: 145.6 CM/S
BH CV VAS RENAL HILUM RIGHT EDV: 28 CM/S
BH CV VAS RENAL HILUM RIGHT PSV: 84.3 CM/S
BH CV XLRA MEAS - KID L LEFT: 3.19 CM
BH CV XLRA MEAS DIST REN A EDV RIGHT: 21.5 CM/S
BH CV XLRA MEAS DIST REN A PSV RIGHT: 111.2 CM/S
BH CV XLRA MEAS KID H RIGHT: 5.47 CM
BH CV XLRA MEAS KID L RIGHT: 10.44 CM
BH CV XLRA MEAS KID W RIGHT: 5.23 CM
BH CV XLRA MEAS MID REN A EDV RIGHT: 29.6 CM/S
BH CV XLRA MEAS MID REN A PSV RIGHT: 130.3 CM/S
BH CV XLRA MEAS PROX REN A EDV RIGHT: 23.1 CM/S
BH CV XLRA MEAS PROX REN A PSV RIGHT: 101.7 CM/S
BH CV XLRA MEAS RAR RIGHT: 0.9
BH CV XLRA MEAS RENAL A ORG EDV LEFT: 0 CM/S
BH CV XLRA MEAS RENAL A ORG EDV RIGHT: 24.9 CM/S
BH CV XLRA MEAS RENAL A ORG PSV LEFT: 0 CM/S
BH CV XLRA MEAS RENAL A ORG PSV RIGHT: 114.5 CM/S
LEFT KIDNEY WIDTH: 1.9 CM
RIGHT RENAL UPPER PARENCHYMA MAX: 49.5 CM/S
RIGHT RENAL UPPER PARENCHYMA MIN: 17.3 CM/S
RIGHT RENAL UPPER PARENCHYMA RI: 0.65

## 2018-08-03 RX ORDER — LORATADINE 10 MG/1
TABLET ORAL
Qty: 10 TABLET | Refills: 2 | Status: SHIPPED | OUTPATIENT
Start: 2018-08-03 | End: 2019-04-23 | Stop reason: SDUPTHER

## 2018-10-11 RX ORDER — CLOPIDOGREL BISULFATE 75 MG/1
TABLET ORAL
Qty: 90 TABLET | Refills: 1 | Status: SHIPPED | OUTPATIENT
Start: 2018-10-11 | End: 2019-04-11 | Stop reason: SDUPTHER

## 2018-11-21 ENCOUNTER — TELEPHONE (OUTPATIENT)
Dept: FAMILY MEDICINE CLINIC | Facility: CLINIC | Age: 78
End: 2018-11-21

## 2018-11-29 ENCOUNTER — APPOINTMENT (OUTPATIENT)
Dept: LAB | Facility: HOSPITAL | Age: 78
End: 2018-11-29

## 2018-11-29 ENCOUNTER — OFFICE VISIT (OUTPATIENT)
Dept: FAMILY MEDICINE CLINIC | Facility: CLINIC | Age: 78
End: 2018-11-29

## 2018-11-29 VITALS
WEIGHT: 120.2 LBS | OXYGEN SATURATION: 98 % | BODY MASS INDEX: 23.6 KG/M2 | DIASTOLIC BLOOD PRESSURE: 64 MMHG | SYSTOLIC BLOOD PRESSURE: 110 MMHG | HEIGHT: 60 IN | HEART RATE: 78 BPM

## 2018-11-29 DIAGNOSIS — E78.00 PURE HYPERCHOLESTEROLEMIA: ICD-10-CM

## 2018-11-29 DIAGNOSIS — Z12.39 BREAST CANCER SCREENING: ICD-10-CM

## 2018-11-29 DIAGNOSIS — Z00.00 MEDICARE ANNUAL WELLNESS VISIT, INITIAL: ICD-10-CM

## 2018-11-29 DIAGNOSIS — N18.30 CHRONIC KIDNEY DISEASE, STAGE 3 (HCC): ICD-10-CM

## 2018-11-29 DIAGNOSIS — I15.0 RENOVASCULAR HYPERTENSION: Primary | ICD-10-CM

## 2018-11-29 DIAGNOSIS — C50.919 MALIGNANT NEOPLASM OF FEMALE BREAST, UNSPECIFIED ESTROGEN RECEPTOR STATUS, UNSPECIFIED LATERALITY, UNSPECIFIED SITE OF BREAST (HCC): ICD-10-CM

## 2018-11-29 LAB
ALBUMIN SERPL-MCNC: 4.6 G/DL (ref 3.4–4.8)
ALBUMIN/GLOB SERPL: 1.5 G/DL (ref 1.1–1.8)
ALP SERPL-CCNC: 104 U/L (ref 38–126)
ALT SERPL W P-5'-P-CCNC: 26 U/L (ref 9–52)
ANION GAP SERPL CALCULATED.3IONS-SCNC: 12 MMOL/L (ref 5–15)
ARTICHOKE IGE QN: 62 MG/DL (ref 1–129)
AST SERPL-CCNC: 90 U/L (ref 14–36)
BILIRUB SERPL-MCNC: 0.7 MG/DL (ref 0.2–1.3)
BUN BLD-MCNC: 26 MG/DL (ref 7–21)
BUN/CREAT SERPL: 16.7 (ref 7–25)
CALCIUM SPEC-SCNC: 9.6 MG/DL (ref 8.4–10.2)
CHLORIDE SERPL-SCNC: 98 MMOL/L (ref 95–110)
CHOLEST SERPL-MCNC: 150 MG/DL (ref 0–199)
CO2 SERPL-SCNC: 29 MMOL/L (ref 22–31)
CREAT BLD-MCNC: 1.56 MG/DL (ref 0.5–1)
GFR SERPL CREATININE-BSD FRML MDRD: 32 ML/MIN/1.73 (ref 39–90)
GLOBULIN UR ELPH-MCNC: 3.1 GM/DL (ref 2.3–3.5)
GLUCOSE BLD-MCNC: 103 MG/DL (ref 60–100)
HDLC SERPL-MCNC: 61 MG/DL (ref 60–200)
LDLC/HDLC SERPL: 1.14 {RATIO} (ref 0–3.22)
POTASSIUM BLD-SCNC: 4 MMOL/L (ref 3.5–5.1)
PROT SERPL-MCNC: 7.7 G/DL (ref 6.3–8.6)
SODIUM BLD-SCNC: 139 MMOL/L (ref 137–145)
TRIGL SERPL-MCNC: 97 MG/DL (ref 20–199)

## 2018-11-29 PROCEDURE — 80053 COMPREHEN METABOLIC PANEL: CPT | Performed by: FAMILY MEDICINE

## 2018-11-29 PROCEDURE — G0438 PPPS, INITIAL VISIT: HCPCS | Performed by: FAMILY MEDICINE

## 2018-11-29 PROCEDURE — 80061 LIPID PANEL: CPT | Performed by: FAMILY MEDICINE

## 2018-11-29 PROCEDURE — 99214 OFFICE O/P EST MOD 30 MIN: CPT | Performed by: FAMILY MEDICINE

## 2018-11-29 PROCEDURE — 36415 COLL VENOUS BLD VENIPUNCTURE: CPT | Performed by: FAMILY MEDICINE

## 2018-11-29 RX ORDER — GLUCOSA SU 2KCL/CHONDROITIN SU 500-400 MG
300 CAPSULE ORAL DAILY
Qty: 90 EACH | Refills: 11 | Status: SHIPPED | OUTPATIENT
Start: 2018-11-29 | End: 2021-02-25

## 2018-11-29 RX ORDER — PHENOL 1.4 %
600 AEROSOL, SPRAY (ML) MUCOUS MEMBRANE 2 TIMES DAILY
COMMUNITY
End: 2021-02-25

## 2018-11-29 NOTE — PROGRESS NOTES
QUICK REFERENCE INFORMATION:  The ABCs of the Annual Wellness Visit    Initial Medicare Wellness Visit    HEALTH RISK ASSESSMENT    1940    Recent Hospitalizations:  No hospitalization(s) within the last year..        Current Medical Providers:  Patient Care Team:  Jorge Diana MD as PCP - General  Jorge Diana MD as PCP - Claims Attributed  Marcus Mesa MD as Consulting Physician (Nephrology)  Hamman, Jack L, MD as Surgeon (Cardiothoracic Surgery)        Smoking Status:  Social History     Tobacco Use   Smoking Status Never Smoker   Smokeless Tobacco Never Used       Alcohol Consumption:  Social History     Substance and Sexual Activity   Alcohol Use No       Depression Screen:   PHQ-2/PHQ-9 Depression Screening 11/29/2018   Little interest or pleasure in doing things 0   Feeling down, depressed, or hopeless 0   Trouble falling or staying asleep, or sleeping too much 0   Feeling tired or having little energy 1   Poor appetite or overeating 0   Feeling bad about yourself - or that you are a failure or have let yourself or your family down 0   Trouble concentrating on things, such as reading the newspaper or watching television 0   Moving or speaking so slowly that other people could have noticed. Or the opposite - being so fidgety or restless that you have been moving around a lot more than usual 0   Thoughts that you would be better off dead, or of hurting yourself in some way 0   Total Score 1   If you checked off any problems, how difficult have these problems made it for you to do your work, take care of things at home, or get along with other people? Not difficult at all       Health Habits and Functional and Cognitive Screening:  Functional & Cognitive Status 11/29/2018   Do you have difficulty preparing food and eating? No   Do you have difficulty bathing yourself, getting dressed or grooming yourself? No   Do you have difficulty using the toilet? No   Do you have difficulty moving  around from place to place? No   Do you have trouble with steps or getting out of a bed or a chair? No   In the past year have you fallen or experienced a near fall? No   Current Diet Well Balanced Diet   Dental Exam Up to date   Eye Exam Up to date   Exercise (times per week) 4 times per week   Current Exercise Activities Include Walking   Do you need help using the phone?  No   Are you deaf or do you have serious difficulty hearing?  Yes   Do you need help with transportation? Yes   Do you need help shopping? No   Do you need help preparing meals?  No   Do you need help with housework?  No   Do you need help with laundry? No   Do you need help taking your medications? No   Do you need help managing money? No   Do you ever drive or ride in a car without wearing a seat belt? No   Have you felt unusual stress, anger or loneliness in the last month? Yes   Who do you live with? Alone   If you need help, do you have trouble finding someone available to you? No   Have you been bothered in the last four weeks by sexual problems? No   Do you have difficulty concentrating, remembering or making decisions? No           Does the patient have evidence of cognitive impairment? No    Asiprin use counseling: Does not need ASA (and currently is not on it)      Recent Lab Results:    Visual Acuity:  Vision Screening Comments: Patient has had eye exam within last 6 months    Age-appropriate Screening Schedule:  Refer to the list below for future screening recommendations based on patient's age, sex and/or medical conditions. Orders for these recommended tests are listed in the plan section. The patient has been provided with a written plan.    Health Maintenance   Topic Date Due   • URINE MICROALBUMIN  1940   • HEMOGLOBIN A1C  08/30/2016   • ZOSTER VACCINE (2 of 2) 01/31/2017   • MAMMOGRAM  12/20/2018   • DXA SCAN  04/19/2019   • LIPID PANEL  05/29/2019   • TDAP/TD VACCINES (2 - Td) 07/08/2024   • INFLUENZA VACCINE  Completed    • PNEUMOCOCCAL VACCINES (65+ LOW/MEDIUM RISK)  Completed        Subjective   History of Present Illness    Ilda Leon is a 78 y.o. female who presents for an Annual Wellness Visit.    The following portions of the patient's history were reviewed and updated as appropriate: allergies, current medications, past family history, past medical history, past social history, past surgical history and problem list.    Outpatient Medications Prior to Visit   Medication Sig Dispense Refill   • acetaminophen (TYLENOL) 325 MG tablet Take 650 mg by mouth Every 6 (Six) Hours As Needed for Mild Pain .     • amLODIPine (NORVASC) 10 MG tablet Take 1 tablet by mouth Daily. 90 tablet 3   • atorvastatin (LIPITOR) 10 MG tablet TAKE 1 TABLET BY MOUTH DAILY. 90 tablet 2   • clopidogrel (PLAVIX) 75 MG tablet TAKE 1 TABLET BY MOUTH EVERY DAY 90 tablet 1   • loratadine (CLARITIN) 10 MG tablet TAKE 1 TABLET BY MOUTH DAILY. 10 tablet 2   • metoprolol tartrate (LOPRESSOR) 25 MG tablet TAKE 1 TABLET BY MOUTH TWICE A  tablet 2   • torsemide (DEMADEX) 10 MG tablet Take 10 mg by mouth Daily.  1   • Unable to find multivits,therap w-fe,hematin oral     • vitamin D (ERGOCALCIFEROL) 66319 units capsule capsule TAKE ONE CAPSULE BY MOUTH ONCE MONTHLY 4 capsule 3     Facility-Administered Medications Prior to Visit   Medication Dose Route Frequency Provider Last Rate Last Dose   • denosumab (PROLIA) syringe 60 mg  60 mg Subcutaneous Once Jorge Diana MD           Patient Active Problem List   Diagnosis   • Adjustment disorder with anxious mood   • Anemia   • Artificial lens present   • Atopic conjunctivitis   • Chronic kidney disease, stage 3 (CMS/HCC)   • Cortical senile cataract   • Degenerative joint disease involving multiple joints   • Hyperglycemia   • Hyperlipidemia   • Malignant neoplasm of female breast (CMS/HCC)   • Need for prophylactic vaccination against Streptococcus pneumoniae (pneumococcus)   • Nuclear cataract   •  "Osteoporosis declined fosamax   • Peripheral vascular disease (CMS/HCC)   • Renal artery stenosis (CMS/HCC)   • Renovascular hypertension   • Restless legs   • History of screening mammography   • Stricture esophagus   • Trigeminal neuralgia   • Vitamin D deficiency   • Acute right-sided thoracic back pain   • Chronic pain of both ankles   • Pain in both feet   • Osteoporosis   • Osteoporosis       Advance Care Planning:  has an advance directive - a copy has been provided and is in file    Identification of Risk Factors:  Risk factors include: polypharmacy.    Review of Systems    Compared to one year ago, the patient feels her physical health is worse.  Compared to one year ago, the patient feels her mental health is the same.    Objective     Physical Exam    Vitals:    11/29/18 0958   BP: 110/64   Pulse: 78   SpO2: 98%   Weight: 54.5 kg (120 lb 3.2 oz)   Height: 152.4 cm (60\")   PainSc: 0-No pain       Patient's Body mass index is 23.47 kg/m². BMI is within normal parameters. No follow-up required.      Assessment/Plan   Patient Self-Management and Personalized Health Advice  The patient has been provided with information about: exercise and preventive services including:   · Advance directive.    Visit Diagnoses:  No diagnosis found.    No orders of the defined types were placed in this encounter.      Outpatient Encounter Medications as of 11/29/2018   Medication Sig Dispense Refill   • acetaminophen (TYLENOL) 325 MG tablet Take 650 mg by mouth Every 6 (Six) Hours As Needed for Mild Pain .     • amLODIPine (NORVASC) 10 MG tablet Take 1 tablet by mouth Daily. 90 tablet 3   • atorvastatin (LIPITOR) 10 MG tablet TAKE 1 TABLET BY MOUTH DAILY. 90 tablet 2   • clopidogrel (PLAVIX) 75 MG tablet TAKE 1 TABLET BY MOUTH EVERY DAY 90 tablet 1   • loratadine (CLARITIN) 10 MG tablet TAKE 1 TABLET BY MOUTH DAILY. 10 tablet 2   • metoprolol tartrate (LOPRESSOR) 25 MG tablet TAKE 1 TABLET BY MOUTH TWICE A  tablet 2   • " torsemide (DEMADEX) 10 MG tablet Take 10 mg by mouth Daily.  1   • Unable to find multivits,therap w-fe,hematin oral     • vitamin D (ERGOCALCIFEROL) 46541 units capsule capsule TAKE ONE CAPSULE BY MOUTH ONCE MONTHLY 4 capsule 3     Facility-Administered Encounter Medications as of 11/29/2018   Medication Dose Route Frequency Provider Last Rate Last Dose   • denosumab (PROLIA) syringe 60 mg  60 mg Subcutaneous Once Jorge Diana MD           Reviewed use of high risk medication in the elderly: yes  Reviewed for potential of harmful drug interactions in the elderly: yes    Follow Up:  No Follow-up on file.     An After Visit Summary and PPPS with all of these plans were given to the patient.

## 2018-11-29 NOTE — PROGRESS NOTES
Renal function is slightly worse.  She still is stage III chronic kidney disease but is coming close to stage IV.  Recommend repeating a renal function panel in 1 month to see if it is progressing to get worse

## 2018-11-29 NOTE — PROGRESS NOTES
Subjective   Ilda Leon is a 78 y.o. female.     Hyperlipidemia   This is a chronic problem. The current episode started more than 1 year ago. Recent lipid tests were reviewed and are normal. There are no known factors aggravating her hyperlipidemia. Associated symptoms include myalgias. Pertinent negatives include no chest pain or shortness of breath.   Arthritis   Presents for follow-up visit. She complains of pain and joint swelling. She reports no stiffness or joint warmth. The symptoms have been stable. Affected locations include the right ankle. Her pain is at a severity of 8/10.   Hypertension   This is a chronic problem. The current episode started more than 1 year ago. The problem is unchanged. The problem is controlled. Associated symptoms include peripheral edema. Pertinent negatives include no chest pain, orthopnea, palpitations, PND or shortness of breath.   Chronic Kidney Disease   This is a chronic problem. The current episode started more than 1 year ago. The problem occurs constantly. The problem has been unchanged. Associated symptoms include congestion, joint swelling and myalgias. Pertinent negatives include no chest pain.        The following portions of the patient's history were reviewed and updated as appropriate: allergies, current medications, past family history, past medical history, past social history, past surgical history and problem list.    Review of Systems   HENT: Positive for congestion, nosebleeds, postnasal drip and rhinorrhea.    Respiratory: Negative for shortness of breath.    Cardiovascular: Negative for chest pain, palpitations, orthopnea and PND.   Musculoskeletal: Positive for arthritis, joint swelling and myalgias. Negative for stiffness.       Objective   Physical Exam   Constitutional: She is oriented to person, place, and time. She appears well-developed and well-nourished. No distress.   HENT:   Head: Normocephalic and atraumatic.   Right Ear: No lacerations.  No drainage, swelling or tenderness. No foreign bodies. Tympanic membrane is not injected. No middle ear effusion.   Left Ear: No lacerations. No drainage, swelling or tenderness. No foreign bodies. Tympanic membrane is not injected.  No middle ear effusion. No decreased hearing is noted.   Nose: Mucosal edema and rhinorrhea present.   Mouth/Throat: Uvula is midline, oropharynx is clear and moist and mucous membranes are normal.   Cardiovascular: Normal rate, regular rhythm, normal heart sounds and intact distal pulses. Exam reveals no gallop.   No murmur heard.  Pulmonary/Chest: Effort normal and breath sounds normal. No respiratory distress. She has no wheezes. She has no rales. She exhibits no tenderness.   Musculoskeletal: She exhibits edema.        Right ankle: She exhibits decreased range of motion and swelling. Tenderness.   Neurological: She is alert and oriented to person, place, and time.   Skin: Skin is warm and dry. She is not diaphoretic.   Psychiatric: She has a normal mood and affect. Her behavior is normal. Judgment and thought content normal.   Nursing note and vitals reviewed.      Assessment/Plan   Problems Addressed this Visit        Cardiovascular and Mediastinum    Hyperlipidemia    Relevant Orders    Lipid Panel    Renovascular hypertension - Primary    Relevant Orders    Comprehensive Metabolic Panel       Genitourinary    Chronic kidney disease, stage 3 (CMS/HCC)       Other    Malignant neoplasm of female breast (CMS/HCC)    Relevant Orders    Mammo Screening Digital Tomosynthesis Bilateral With CAD      Other Visit Diagnoses     Breast cancer screening        Relevant Orders    Mammo Screening Digital Tomosynthesis Bilateral With CAD    Medicare annual wellness visit, initial

## 2018-11-30 ENCOUNTER — TELEPHONE (OUTPATIENT)
Dept: FAMILY MEDICINE CLINIC | Facility: CLINIC | Age: 78
End: 2018-11-30

## 2018-11-30 DIAGNOSIS — R94.4 ABNORMAL RENAL FUNCTION TEST: Primary | ICD-10-CM

## 2018-11-30 NOTE — TELEPHONE ENCOUNTER
Pr Dr. Diana, Ms. Leon has been called with her recent lab results & recommendations.  Continue her current medications and follow-up as planned or sooner if any problems.    Labs Ordered.      ----- Message from Jorge Diana MD sent at 11/29/2018  1:16 PM CST -----  Renal function is slightly worse.  She still is stage III chronic kidney disease but is coming close to stage IV.  Recommend repeating a renal function panel in 1 month to see if it is progressing to get worse

## 2018-11-30 NOTE — PROGRESS NOTES
Pr Dr. Diana, Ms. Leon has been called with her recent lab results & recommendations.  Continue her current medications and follow-up as planned or sooner if any problems.    Labs Ordered.

## 2018-12-03 ENCOUNTER — LAB (OUTPATIENT)
Dept: LAB | Facility: HOSPITAL | Age: 78
End: 2018-12-03

## 2018-12-03 DIAGNOSIS — R94.4 ABNORMAL RENAL FUNCTION TEST: ICD-10-CM

## 2018-12-03 LAB
ALBUMIN SERPL-MCNC: 4.7 G/DL (ref 3.4–4.8)
ANION GAP SERPL CALCULATED.3IONS-SCNC: 12 MMOL/L (ref 5–15)
BUN BLD-MCNC: 25 MG/DL (ref 7–21)
BUN/CREAT SERPL: 16.2 (ref 7–25)
CALCIUM SPEC-SCNC: 9.8 MG/DL (ref 8.4–10.2)
CHLORIDE SERPL-SCNC: 98 MMOL/L (ref 95–110)
CO2 SERPL-SCNC: 28 MMOL/L (ref 22–31)
CREAT BLD-MCNC: 1.54 MG/DL (ref 0.5–1)
GFR SERPL CREATININE-BSD FRML MDRD: 32 ML/MIN/1.73 (ref 39–90)
GLUCOSE BLD-MCNC: 107 MG/DL (ref 60–100)
PHOSPHATE SERPL-MCNC: 3.2 MG/DL (ref 2.4–4.4)
POTASSIUM BLD-SCNC: 3.5 MMOL/L (ref 3.5–5.1)
SODIUM BLD-SCNC: 138 MMOL/L (ref 137–145)

## 2018-12-03 PROCEDURE — 36415 COLL VENOUS BLD VENIPUNCTURE: CPT

## 2018-12-03 PROCEDURE — 80069 RENAL FUNCTION PANEL: CPT

## 2018-12-04 ENCOUNTER — TELEPHONE (OUTPATIENT)
Dept: FAMILY MEDICINE CLINIC | Facility: CLINIC | Age: 78
End: 2018-12-04

## 2018-12-04 NOTE — TELEPHONE ENCOUNTER
Pr Dr. Diana, Ms. Leon has been called with her recent lab results & recommendations.  Continue her current medications and follow-up as planned or sooner if any problems.      ----- Message from Jorge Diana MD sent at 12/3/2018  4:01 PM CST -----  Stable at chronic kidney disease stage III at this point

## 2018-12-07 RX ORDER — ATORVASTATIN CALCIUM 10 MG/1
TABLET, FILM COATED ORAL
Qty: 90 TABLET | Refills: 2 | Status: SHIPPED | OUTPATIENT
Start: 2018-12-07 | End: 2019-12-02 | Stop reason: SDUPTHER

## 2019-04-11 RX ORDER — CLOPIDOGREL BISULFATE 75 MG/1
TABLET ORAL
Qty: 90 TABLET | Refills: 1 | Status: SHIPPED | OUTPATIENT
Start: 2019-04-11 | End: 2019-10-07 | Stop reason: SDUPTHER

## 2019-04-23 RX ORDER — LORATADINE 10 MG/1
TABLET ORAL
Qty: 10 TABLET | Refills: 2 | Status: SHIPPED | OUTPATIENT
Start: 2019-04-23

## 2019-05-29 ENCOUNTER — OFFICE VISIT (OUTPATIENT)
Dept: FAMILY MEDICINE CLINIC | Facility: CLINIC | Age: 79
End: 2019-05-29

## 2019-05-29 ENCOUNTER — APPOINTMENT (OUTPATIENT)
Dept: LAB | Facility: HOSPITAL | Age: 79
End: 2019-05-29

## 2019-05-29 VITALS
BODY MASS INDEX: 22.34 KG/M2 | OXYGEN SATURATION: 98 % | WEIGHT: 113.8 LBS | HEART RATE: 78 BPM | SYSTOLIC BLOOD PRESSURE: 118 MMHG | DIASTOLIC BLOOD PRESSURE: 60 MMHG | HEIGHT: 60 IN

## 2019-05-29 DIAGNOSIS — C50.919 MALIGNANT NEOPLASM OF FEMALE BREAST, UNSPECIFIED ESTROGEN RECEPTOR STATUS, UNSPECIFIED LATERALITY, UNSPECIFIED SITE OF BREAST (HCC): ICD-10-CM

## 2019-05-29 DIAGNOSIS — E78.00 PURE HYPERCHOLESTEROLEMIA: ICD-10-CM

## 2019-05-29 DIAGNOSIS — I15.0 RENOVASCULAR HYPERTENSION: Primary | ICD-10-CM

## 2019-05-29 DIAGNOSIS — I70.1 RENAL ARTERY STENOSIS (HCC): ICD-10-CM

## 2019-05-29 DIAGNOSIS — N18.30 CHRONIC KIDNEY DISEASE, STAGE 3 (HCC): ICD-10-CM

## 2019-05-29 DIAGNOSIS — M15.9 PRIMARY OSTEOARTHRITIS INVOLVING MULTIPLE JOINTS: ICD-10-CM

## 2019-05-29 LAB
ALBUMIN SERPL-MCNC: 4.2 G/DL (ref 3.5–5.2)
ALBUMIN/GLOB SERPL: 1.4 G/DL
ALP SERPL-CCNC: 141 U/L (ref 39–117)
ALT SERPL W P-5'-P-CCNC: 12 U/L (ref 1–33)
ANION GAP SERPL CALCULATED.3IONS-SCNC: 13.7 MMOL/L
AST SERPL-CCNC: 21 U/L (ref 1–32)
BILIRUB SERPL-MCNC: 0.5 MG/DL (ref 0.2–1.2)
BUN BLD-MCNC: 28 MG/DL (ref 8–23)
BUN/CREAT SERPL: 19.2 (ref 7–25)
CALCIUM SPEC-SCNC: 9.3 MG/DL (ref 8.6–10.5)
CHLORIDE SERPL-SCNC: 99 MMOL/L (ref 98–107)
CHOLEST SERPL-MCNC: 149 MG/DL (ref 0–200)
CO2 SERPL-SCNC: 27.3 MMOL/L (ref 22–29)
CREAT BLD-MCNC: 1.46 MG/DL (ref 0.57–1)
GFR SERPL CREATININE-BSD FRML MDRD: 34 ML/MIN/1.73
GLOBULIN UR ELPH-MCNC: 3.1 GM/DL
GLUCOSE BLD-MCNC: 95 MG/DL (ref 65–99)
HDLC SERPL-MCNC: 57 MG/DL (ref 40–60)
LDLC SERPL CALC-MCNC: 72 MG/DL (ref 0–100)
LDLC/HDLC SERPL: 1.26 {RATIO}
POTASSIUM BLD-SCNC: 4 MMOL/L (ref 3.5–5.2)
PROT SERPL-MCNC: 7.3 G/DL (ref 6–8.5)
SODIUM BLD-SCNC: 140 MMOL/L (ref 136–145)
TRIGL SERPL-MCNC: 102 MG/DL (ref 0–150)
VLDLC SERPL-MCNC: 20.4 MG/DL (ref 5–40)

## 2019-05-29 PROCEDURE — 80053 COMPREHEN METABOLIC PANEL: CPT | Performed by: FAMILY MEDICINE

## 2019-05-29 PROCEDURE — 80061 LIPID PANEL: CPT | Performed by: FAMILY MEDICINE

## 2019-05-29 PROCEDURE — 99214 OFFICE O/P EST MOD 30 MIN: CPT | Performed by: FAMILY MEDICINE

## 2019-05-29 PROCEDURE — 36415 COLL VENOUS BLD VENIPUNCTURE: CPT | Performed by: FAMILY MEDICINE

## 2019-05-29 PROCEDURE — 96372 THER/PROPH/DIAG INJ SC/IM: CPT | Performed by: FAMILY MEDICINE

## 2019-05-29 RX ORDER — TRIAMCINOLONE ACETONIDE 40 MG/ML
80 INJECTION, SUSPENSION INTRA-ARTICULAR; INTRAMUSCULAR ONCE
Status: COMPLETED | OUTPATIENT
Start: 2019-05-29 | End: 2019-05-29

## 2019-05-29 RX ADMIN — TRIAMCINOLONE ACETONIDE 80 MG: 40 INJECTION, SUSPENSION INTRA-ARTICULAR; INTRAMUSCULAR at 10:31

## 2019-05-29 NOTE — PROGRESS NOTES
Subjective   Ilda Leon is a 79 y.o. female.     Hypertension   This is a chronic problem. The current episode started more than 1 year ago. The problem is unchanged. The problem is controlled. Associated symptoms include peripheral edema. Pertinent negatives include no chest pain, orthopnea, palpitations, PND or shortness of breath.   Hyperlipidemia   This is a chronic problem. The current episode started more than 1 year ago. Recent lipid tests were reviewed and are normal. There are no known factors aggravating her hyperlipidemia. Associated symptoms include myalgias. Pertinent negatives include no chest pain or shortness of breath.   Arthritis   Presents for follow-up visit. She complains of pain and joint swelling. She reports no stiffness or joint warmth. The symptoms have been stable. Affected locations include the right ankle, neck and left ankle. Her pain is at a severity of 8/10.   Chronic Kidney Disease   This is a chronic problem. The current episode started more than 1 year ago. The problem occurs constantly. The problem has been unchanged. Associated symptoms include congestion, joint swelling and myalgias. Pertinent negatives include no chest pain.        The following portions of the patient's history were reviewed and updated as appropriate: allergies, current medications, past family history, past medical history, past social history, past surgical history and problem list.    Review of Systems   HENT: Positive for congestion, nosebleeds and postnasal drip.    Respiratory: Negative for shortness of breath.    Cardiovascular: Negative for chest pain, palpitations, orthopnea and PND.   Musculoskeletal: Positive for arthritis, joint swelling and myalgias. Negative for stiffness.       Objective   Physical Exam   Constitutional: She is oriented to person, place, and time. She appears well-developed and well-nourished. No distress.   HENT:   Head: Normocephalic and atraumatic.   Right Ear: No  lacerations. No drainage, swelling or tenderness. No foreign bodies. Tympanic membrane is not injected. No middle ear effusion.   Left Ear: No lacerations. No drainage, swelling or tenderness. No foreign bodies. Tympanic membrane is not injected.  No middle ear effusion. No decreased hearing is noted.   Nose: Mucosal edema and rhinorrhea present.   Mouth/Throat: Uvula is midline, oropharynx is clear and moist and mucous membranes are normal.   Cardiovascular: Normal rate, regular rhythm, normal heart sounds and intact distal pulses. Exam reveals no gallop.   No murmur heard.  Pulmonary/Chest: Effort normal and breath sounds normal. No respiratory distress. She has no wheezes. She has no rales. She exhibits no tenderness.   Musculoskeletal: She exhibits edema.        Right ankle: She exhibits decreased range of motion and swelling. Tenderness.        Cervical back: She exhibits decreased range of motion, tenderness, bony tenderness and pain. She exhibits no swelling, no edema, no deformity, no laceration and no spasm.   Neurological: She is alert and oriented to person, place, and time.   Skin: Skin is warm and dry. She is not diaphoretic.   Psychiatric: She has a normal mood and affect. Her behavior is normal. Judgment and thought content normal.   Nursing note and vitals reviewed.      Assessment/Plan   Problems Addressed this Visit        Cardiovascular and Mediastinum    Hyperlipidemia    Relevant Orders    Lipid Panel    Renal artery stenosis (CMS/HCC)    Renovascular hypertension - Primary    Relevant Orders    Comprehensive Metabolic Panel       Musculoskeletal and Integument    Degenerative joint disease involving multiple joints    Relevant Medications    triamcinolone acetonide (KENALOG-40) injection 80 mg       Genitourinary    Chronic kidney disease, stage 3 (CMS/HCC)    Relevant Orders    Comprehensive Metabolic Panel       Other    Malignant neoplasm of female breast (CMS/HCC)        Patient has been  erroneously marked as diabetic. Based on the available clinical information, she does not have diabetes and should therefore be excluded from diabetic health maintenance and quality measures for the remainder of the reporting period.    No new masses on SBE

## 2019-05-31 ENCOUNTER — TELEPHONE (OUTPATIENT)
Dept: FAMILY MEDICINE CLINIC | Facility: CLINIC | Age: 79
End: 2019-05-31

## 2019-05-31 NOTE — PROGRESS NOTES
Per Dr. Diana, Ms. Koehler has been called with recent lab results & recommendations.  Continue current medications and follow-up as planned or sooner if any problems.

## 2019-05-31 NOTE — TELEPHONE ENCOUNTER
Per Dr. Diana, Ms. Koehler has been called with recent lab results & recommendations.  Continue current medications and follow-up as planned or sooner if any problems.        ----- Message from Jorge Diana MD sent at 5/30/2019  3:38 PM CDT -----  Renal function stable otherwise okay

## 2019-07-12 DIAGNOSIS — I70.1 RENAL ARTERY STENOSIS (HCC): Primary | ICD-10-CM

## 2019-07-16 ENCOUNTER — OFFICE VISIT (OUTPATIENT)
Dept: CARDIAC SURGERY | Facility: CLINIC | Age: 79
End: 2019-07-16

## 2019-07-16 VITALS
HEIGHT: 60 IN | DIASTOLIC BLOOD PRESSURE: 65 MMHG | HEART RATE: 92 BPM | TEMPERATURE: 97.1 F | BODY MASS INDEX: 22.78 KG/M2 | OXYGEN SATURATION: 96 % | WEIGHT: 116 LBS | SYSTOLIC BLOOD PRESSURE: 150 MMHG

## 2019-07-16 DIAGNOSIS — I70.1 RENAL ARTERY STENOSIS (HCC): Primary | ICD-10-CM

## 2019-07-16 PROCEDURE — 99213 OFFICE O/P EST LOW 20 MIN: CPT | Performed by: THORACIC SURGERY (CARDIOTHORACIC VASCULAR SURGERY)

## 2019-07-21 NOTE — PROGRESS NOTES
Ilda Leon  1940            79 y.o.      7/16/2019    Chief Complaint   Patient presents with   • Renal Artery Stenosis     1 year follow up     PCP MARIA R GONCALVES MD     Patient returns for follow up stent right renal artery 5-18-06 Known atrophy left kidney.     Recent laboratory work 5-29-18 straight to be within 33, creatinine 1.31, GFR 39.  Carotid duplex scan 11/20/2015 demonstrated 0-15% stenosis bilaterally and with antegrade flow in both vertebral arteries    eGFR 34  5-29-19     Glucose 65 - 99 mg/dL 95    BUN 8 - 23 mg/dL 28 Abnormally high     Creatinine 0.57 - 1.00 mg/dL 1.46 Abnormally high     Sodium 136 - 145 mmol/L 140    Potassium 3.5 - 5.2 mmol/L 4.0    Chloride 98 - 107 mmol/L 99    CO2 22.0 - 29.0 mmol/L 27.3    Calcium 8.6 - 10.5 mg/dL 9.3    Total Protein 6.0 - 8.5 g/dL 7.3    Albumin 3.50 - 5.20 g/dL 4.20    ALT (SGPT) 1 - 33 U/L 12    AST (SGOT) 1 - 32 U/L 21    Alkaline Phosphatase 39 - 117 U/L 141 Abnormally high     Total Bilirubin 0.2 - 1.2 mg/dL 0.5    eGFR Non African Amer >60 mL/min/1.73 34 Abnormally low     Globulin gm/dL 3.1    A/G Ratio g/dL 1.4    BUN/Creatinine Ratio 7.0 - 25.0 19.2    Anion Gap mmol/L 13.7    Resulting Agency  [facility]      Narrative   Performed by: [facility]   GFR Normal >60  Chronic Kidney Disease <60  Kidney Failure <15      Specimen Collected: 05/29/19 10:37 Last Resulted: 05/29/19 19:19                 HPI        ROS       Current Outpatient Medications:   •  acetaminophen (TYLENOL) 325 MG tablet, Take 650 mg by mouth Every 6 (Six) Hours As Needed for Mild Pain ., Disp: , Rfl:   •  amLODIPine (NORVASC) 10 MG tablet, Take 1 tablet by mouth Daily., Disp: 90 tablet, Rfl: 3  •  atorvastatin (LIPITOR) 10 MG tablet, TAKE 1 TABLET BY MOUTH DAILY., Disp: 90 tablet, Rfl: 2  •  calcium carbonate (OS-STEVIE) 600 MG tablet, Take 600 mg by mouth 2 (Two) Times a Day., Disp: , Rfl:   •  clopidogrel (PLAVIX) 75 MG tablet, TAKE 1 TABLET BY MOUTH EVERY DAY, Disp:  "90 tablet, Rfl: 1  •  Coenzyme Q10 (CO Q10) 100 MG capsule, Take 300 mg by mouth Daily., Disp: 90 each, Rfl: 11  •  loratadine (CLARITIN) 10 MG tablet, TAKE 1 TABLET BY MOUTH DAILY., Disp: 10 tablet, Rfl: 2  •  metoprolol tartrate (LOPRESSOR) 25 MG tablet, TAKE 1 TABLET BY MOUTH TWICE A DAY, Disp: 180 tablet, Rfl: 2  •  torsemide (DEMADEX) 10 MG tablet, Take 10 mg by mouth Daily., Disp: , Rfl: 1  •  Unable to find, multivits,therap w-fe,hematin oral, Disp: , Rfl:     Current Facility-Administered Medications:   •  denosumab (PROLIA) syringe 60 mg, 60 mg, Subcutaneous, Once, Jorge Diana MD    The following portions of the patient's history were reviewed and updated as appropriate: allergies, current medications, past family history, past medical history, past social history, past surgical history and problem list.        Past Surgical History:   Procedure Laterality Date   • BREAST BIOPSY     • BREAST LUMPECTOMY Right 1991   • CATARACT EXTRACTION WITH INTRAOCULAR LENS IMPLANT Right 2008   • CHOLECYSTECTOMY  03/04/2008    Laparoscopic cholecystectomy with operative cholangiogram. Acute cholecystitis   • COLONOSCOPY  2006   • INJECTION OF MEDICATION  10/03/2011    Kenalog (1)    • MAMMO BILATERAL  02/28/2015    Screening   • RENAL ARTERY STENT Right 05/18/2006    5x15mm balloon expandable stent x2            Physical Exam  /65 (BP Location: Left arm)   Pulse 92   Temp 97.1 °F (36.2 °C) (Temporal)   Ht 152.4 cm (60\")   Wt 52.6 kg (116 lb)   SpO2 96%   BMI 22.65 kg/m²     HEENT: No masses soft sounds over the carotid artery    CHEST: Clear to auscultation    HEART: Regular rate and rhythm    ABDOMEN: Nontender    EXTREMITIES: Dorsalis pedis pulses easily palpable bilaterally.  Patient complains of  arthritic pain in both ankles.    VASCULAR LAB STUDIES:DUPLEX SCAN RIGHT RENAL ARTERY (7-16-19)  Interpretation Summary     · Appears to be a patent RT renal artery stent  · Known atrophy of the left kidney   "                RADIOLOGY:      Renal artery stenosis (CMS/HCC) [I70.1]    IMPRESSION:                  Assessment/Plan  Ilda was seen today for renal artery stenosis.    Diagnoses and all orders for this visit:    Renal artery stenosis (CMS/HCC)                  Return in about 1 year (around 7/21/2020), or RENAL ARTERY DUPLEX SCAN,CAROTID SCAN.            Jack L. Hamman, MD  7/20/2019

## 2019-08-19 RX ORDER — AMLODIPINE BESYLATE 10 MG/1
TABLET ORAL
Qty: 90 TABLET | Refills: 3 | Status: SHIPPED | OUTPATIENT
Start: 2019-08-19 | End: 2020-07-14

## 2019-10-07 RX ORDER — CLOPIDOGREL BISULFATE 75 MG/1
TABLET ORAL
Qty: 90 TABLET | Refills: 1 | Status: SHIPPED | OUTPATIENT
Start: 2019-10-07 | End: 2020-04-13 | Stop reason: SDUPTHER

## 2019-12-02 ENCOUNTER — OFFICE VISIT (OUTPATIENT)
Dept: FAMILY MEDICINE CLINIC | Facility: CLINIC | Age: 79
End: 2019-12-02

## 2019-12-02 ENCOUNTER — APPOINTMENT (OUTPATIENT)
Dept: LAB | Facility: HOSPITAL | Age: 79
End: 2019-12-02

## 2019-12-02 VITALS
WEIGHT: 119 LBS | OXYGEN SATURATION: 98 % | SYSTOLIC BLOOD PRESSURE: 122 MMHG | DIASTOLIC BLOOD PRESSURE: 68 MMHG | HEART RATE: 78 BPM | HEIGHT: 60 IN | BODY MASS INDEX: 23.36 KG/M2

## 2019-12-02 DIAGNOSIS — I15.0 RENOVASCULAR HYPERTENSION: Primary | ICD-10-CM

## 2019-12-02 DIAGNOSIS — N18.30 CHRONIC KIDNEY DISEASE, STAGE 3 (HCC): ICD-10-CM

## 2019-12-02 DIAGNOSIS — E78.00 PURE HYPERCHOLESTEROLEMIA: ICD-10-CM

## 2019-12-02 DIAGNOSIS — M54.2 NECK PAIN: ICD-10-CM

## 2019-12-02 DIAGNOSIS — J30.2 SEASONAL ALLERGIC RHINITIS, UNSPECIFIED TRIGGER: ICD-10-CM

## 2019-12-02 LAB
ALBUMIN SERPL-MCNC: 4.3 G/DL (ref 3.5–5.2)
ALBUMIN/GLOB SERPL: 1.2 G/DL
ALP SERPL-CCNC: 111 U/L (ref 39–117)
ALT SERPL W P-5'-P-CCNC: 15 U/L (ref 1–33)
ANION GAP SERPL CALCULATED.3IONS-SCNC: 16.7 MMOL/L (ref 5–15)
AST SERPL-CCNC: 24 U/L (ref 1–32)
BILIRUB SERPL-MCNC: 0.6 MG/DL (ref 0.2–1.2)
BUN BLD-MCNC: 22 MG/DL (ref 8–23)
BUN/CREAT SERPL: 16.5 (ref 7–25)
CALCIUM SPEC-SCNC: 9.7 MG/DL (ref 8.6–10.5)
CHLORIDE SERPL-SCNC: 102 MMOL/L (ref 98–107)
CHOLEST SERPL-MCNC: 155 MG/DL (ref 0–200)
CO2 SERPL-SCNC: 24.3 MMOL/L (ref 22–29)
CREAT BLD-MCNC: 1.33 MG/DL (ref 0.57–1)
GFR SERPL CREATININE-BSD FRML MDRD: 38 ML/MIN/1.73
GLOBULIN UR ELPH-MCNC: 3.5 GM/DL
GLUCOSE BLD-MCNC: 102 MG/DL (ref 65–99)
HDLC SERPL-MCNC: 63 MG/DL (ref 40–60)
LDLC SERPL CALC-MCNC: 67 MG/DL (ref 0–100)
LDLC/HDLC SERPL: 1.06 {RATIO}
POTASSIUM BLD-SCNC: 3.9 MMOL/L (ref 3.5–5.2)
PROT SERPL-MCNC: 7.8 G/DL (ref 6–8.5)
SODIUM BLD-SCNC: 143 MMOL/L (ref 136–145)
TRIGL SERPL-MCNC: 125 MG/DL (ref 0–150)
VLDLC SERPL-MCNC: 25 MG/DL (ref 5–40)

## 2019-12-02 PROCEDURE — 80053 COMPREHEN METABOLIC PANEL: CPT | Performed by: FAMILY MEDICINE

## 2019-12-02 PROCEDURE — 80061 LIPID PANEL: CPT | Performed by: FAMILY MEDICINE

## 2019-12-02 PROCEDURE — 96372 THER/PROPH/DIAG INJ SC/IM: CPT | Performed by: FAMILY MEDICINE

## 2019-12-02 PROCEDURE — 36415 COLL VENOUS BLD VENIPUNCTURE: CPT | Performed by: FAMILY MEDICINE

## 2019-12-02 PROCEDURE — 99214 OFFICE O/P EST MOD 30 MIN: CPT | Performed by: FAMILY MEDICINE

## 2019-12-02 RX ORDER — FLUTICASONE PROPIONATE 50 MCG
2 SPRAY, SUSPENSION (ML) NASAL DAILY
Qty: 9.9 ML | Refills: 11 | Status: SHIPPED | OUTPATIENT
Start: 2019-12-02 | End: 2020-01-01

## 2019-12-02 RX ORDER — FUROSEMIDE 20 MG/1
0.5 TABLET ORAL DAILY
COMMUNITY
Start: 2019-11-29

## 2019-12-02 RX ORDER — ATORVASTATIN CALCIUM 10 MG/1
TABLET, FILM COATED ORAL
Qty: 90 TABLET | Refills: 2 | Status: SHIPPED | OUTPATIENT
Start: 2019-12-02 | End: 2020-09-10

## 2019-12-02 RX ORDER — TRIAMCINOLONE ACETONIDE 40 MG/ML
80 INJECTION, SUSPENSION INTRA-ARTICULAR; INTRAMUSCULAR ONCE
Status: COMPLETED | OUTPATIENT
Start: 2019-12-02 | End: 2019-12-02

## 2019-12-02 RX ADMIN — TRIAMCINOLONE ACETONIDE 80 MG: 40 INJECTION, SUSPENSION INTRA-ARTICULAR; INTRAMUSCULAR at 11:03

## 2019-12-02 NOTE — PROGRESS NOTES
Subjective   Ilda Leon is a 79 y.o. female.     Hypertension   This is a chronic problem. The current episode started more than 1 year ago. The problem is unchanged. The problem is controlled. Associated symptoms include peripheral edema. Pertinent negatives include no chest pain, orthopnea, palpitations, PND or shortness of breath.   Hyperlipidemia   This is a chronic problem. The current episode started more than 1 year ago. Recent lipid tests were reviewed and are normal. There are no known factors aggravating her hyperlipidemia. Associated symptoms include myalgias. Pertinent negatives include no chest pain or shortness of breath.   Arthritis   Presents for follow-up visit. She complains of pain. She reports no stiffness, joint swelling or joint warmth. The symptoms have been stable. Affected locations include the neck. Her pain is at a severity of 8/10.   Chronic Kidney Disease   This is a chronic problem. The current episode started more than 1 year ago. The problem occurs constantly. The problem has been unchanged. Associated symptoms include congestion and myalgias. Pertinent negatives include no chest pain, coughing, joint swelling or sore throat.   URI    This is a new problem. The current episode started 1 to 4 weeks ago. The problem has been waxing and waning. There has been no fever. Associated symptoms include congestion and sinus pain. Pertinent negatives include no chest pain, coughing, rhinorrhea, sneezing or sore throat.        The following portions of the patient's history were reviewed and updated as appropriate: allergies, current medications, past family history, past medical history, past social history, past surgical history and problem list.    Review of Systems   HENT: Positive for congestion, nosebleeds, postnasal drip and sinus pain. Negative for rhinorrhea, sneezing and sore throat.    Respiratory: Negative for cough and shortness of breath.    Cardiovascular: Negative for chest  pain, palpitations, orthopnea and PND.   Musculoskeletal: Positive for arthritis and myalgias. Negative for joint swelling and stiffness.       Objective   Physical Exam   Constitutional: She is oriented to person, place, and time. She appears well-developed and well-nourished. No distress.   HENT:   Head: Normocephalic and atraumatic.   Right Ear: No lacerations. No drainage, swelling or tenderness. No foreign bodies. Tympanic membrane is not injected. No middle ear effusion.   Left Ear: No lacerations. No drainage, swelling or tenderness. No foreign bodies. Tympanic membrane is not injected.  No middle ear effusion. No decreased hearing is noted.   Nose: Mucosal edema and rhinorrhea present.   Mouth/Throat: Uvula is midline, oropharynx is clear and moist and mucous membranes are normal.   Cardiovascular: Normal rate, regular rhythm, normal heart sounds and intact distal pulses. Exam reveals no gallop.   No murmur heard.  Pulmonary/Chest: Effort normal and breath sounds normal. No respiratory distress. She has no wheezes. She has no rales. She exhibits no tenderness.   Musculoskeletal: She exhibits edema.        Cervical back: She exhibits decreased range of motion, tenderness, bony tenderness and pain. She exhibits no swelling, no edema, no deformity, no laceration and no spasm.   Neurological: She is alert and oriented to person, place, and time.   Skin: Skin is warm and dry. She is not diaphoretic.   Psychiatric: She has a normal mood and affect. Her behavior is normal. Judgment and thought content normal.   Nursing note and vitals reviewed.      Assessment/Plan   Problems Addressed this Visit        Cardiovascular and Mediastinum    Hyperlipidemia    Relevant Orders    Lipid Panel    Renovascular hypertension - Primary    Relevant Medications    furosemide (LASIX) 20 MG tablet    Other Relevant Orders    Comprehensive Metabolic Panel       Musculoskeletal and Integument    Degenerative joint disease involving  "multiple joints    Relevant Medications    triamcinolone acetonide (KENALOG-40) injection 80 mg       Genitourinary    Chronic kidney disease, stage 3 (CMS/HCC)    Relevant Medications    furosemide (LASIX) 20 MG tablet      Other Visit Diagnoses     Seasonal allergic rhinitis, unspecified trigger        Relevant Medications    triamcinolone acetonide (KENALOG-40) injection 80 mg             Declined DEXA scan today      Visit Vitals  /68   Pulse 78   Ht 152.4 cm (60\")   Wt 54 kg (119 lb)   SpO2 98%   BMI 23.24 kg/m²       Body mass index is 23.24 kg/m².            This document has been electronically signed by Jorge Diana MD on December 2, 2019 11:00 AM    "

## 2019-12-04 ENCOUNTER — TELEPHONE (OUTPATIENT)
Dept: FAMILY MEDICINE CLINIC | Facility: CLINIC | Age: 79
End: 2019-12-04

## 2019-12-05 NOTE — PROGRESS NOTES
Per Dr. Diana, Ms. Leon has been called with recent lab results & recommendations.  Continue current medications and follow-up as planned or sooner if any problems.

## 2019-12-05 NOTE — TELEPHONE ENCOUNTER
Per Dr. Diana, Ms. Leon has been called with recent lab results & recommendations.  Continue current medications and follow-up as planned or sooner if any problems.      ----- Message from Jorge Diana MD sent at 12/4/2019  9:01 AM CST -----  Okay stable

## 2020-04-13 RX ORDER — CLOPIDOGREL BISULFATE 75 MG/1
75 TABLET ORAL DAILY
Qty: 90 TABLET | Refills: 1 | Status: SHIPPED | OUTPATIENT
Start: 2020-04-13 | End: 2020-10-14

## 2020-07-14 RX ORDER — AMLODIPINE BESYLATE 10 MG/1
TABLET ORAL
Qty: 90 TABLET | Refills: 0 | Status: SHIPPED | OUTPATIENT
Start: 2020-07-14 | End: 2020-10-28

## 2020-07-17 DIAGNOSIS — I70.1 RENAL ARTERY STENOSIS (HCC): ICD-10-CM

## 2020-07-17 DIAGNOSIS — R09.89 CAROTID BRUIT, UNSPECIFIED LATERALITY: Primary | ICD-10-CM

## 2020-07-21 ENCOUNTER — OFFICE VISIT (OUTPATIENT)
Dept: CARDIAC SURGERY | Facility: CLINIC | Age: 80
End: 2020-07-21

## 2020-07-21 VITALS
OXYGEN SATURATION: 100 % | DIASTOLIC BLOOD PRESSURE: 80 MMHG | HEIGHT: 60 IN | SYSTOLIC BLOOD PRESSURE: 126 MMHG | BODY MASS INDEX: 22.97 KG/M2 | HEART RATE: 88 BPM | WEIGHT: 117 LBS

## 2020-07-21 DIAGNOSIS — I65.22 CAROTID STENOSIS, ASYMPTOMATIC, LEFT: ICD-10-CM

## 2020-07-21 DIAGNOSIS — I70.1 RENAL ARTERY STENOSIS (HCC): Primary | ICD-10-CM

## 2020-07-21 PROCEDURE — 99213 OFFICE O/P EST LOW 20 MIN: CPT | Performed by: THORACIC SURGERY (CARDIOTHORACIC VASCULAR SURGERY)

## 2020-07-21 NOTE — PROGRESS NOTES
"Ilda Leon  1940            80 y.o.      7/21/2020    Chief Complaint   Patient presents with   • Follow-up   • Renal Artery Stenosis     • Renal Artery Stenosis       1 year follow up    RENAL ARTERY STENOSIS RIGHT   CAROTID STENOSIS, LEFT, STAGE III CKD  PCP MARIA R GONCALVES MD   NEPHROLOGY: ANTHONY WALTER MD  Patient returns for follow up stent right renal artery 5-18-06 Known atrophy left kidney.     Recent laboratory work 5-29-18  33, creatinine 1.31, GFR 39.  Carotid duplex scan 11/20/2015 demonstrated 0-15% stenosis bilaterally and with antegrade flow in both vertebral arteries    7- The patient has in the past had regularly scheduled appointments with Dr. Anthony aWlter, q 3 months and Dr. Goncalves [id number] so that she has regular follow-up at 3-month intervals by her nephrology consult or her primary care physician.  During the l pandemic she has been reluctant to come into the office for either follow-up visit but did have a \"virtual\" appointment with Dr. Walter approximately 1 month ago.     RECENT LABORATORY FINDINGS:                                                     7mo.                   1yr                      1yr                       1yr  eGFR Non African Amer  >60 mL/min/1.73 38Low   34Low   32Low  R 32Low  R        Current Outpatient Medications:   •  acetaminophen (TYLENOL) 325 MG tablet, Take 650 mg by mouth Every 6 (Six) Hours As Needed for Mild Pain ., Disp: , Rfl:   •  amLODIPine (NORVASC) 10 MG tablet, TAKE 1 TABLET BY MOUTH EVERY DAY, Disp: 90 tablet, Rfl: 0  •  atorvastatin (LIPITOR) 10 MG tablet, TAKE 1 TABLET BY MOUTH DAILY., Disp: 90 tablet, Rfl: 2  •  calcium carbonate (OS-STEVIE) 600 MG tablet, Take 600 mg by mouth 2 (Two) Times a Day., Disp: , Rfl:   •  clopidogrel (PLAVIX) 75 MG tablet, Take 1 tablet by mouth Daily., Disp: 90 tablet, Rfl: 1  •  Coenzyme Q10 (CO Q10) 100 MG capsule, Take 300 mg by mouth Daily., Disp: 90 each, Rfl: 11  •  furosemide (LASIX) " "20 MG tablet, Take 1 tablet by mouth Daily., Disp: , Rfl:   •  loratadine (CLARITIN) 10 MG tablet, TAKE 1 TABLET BY MOUTH DAILY., Disp: 10 tablet, Rfl: 2  •  metoprolol tartrate (LOPRESSOR) 25 MG tablet, TAKE 1 TABLET BY MOUTH TWICE A DAY, Disp: 180 tablet, Rfl: 2  •  Unable to find, multivits,therap w-fe,hematin oral, Disp: , Rfl:     Current Facility-Administered Medications:   •  denosumab (PROLIA) syringe 60 mg, 60 mg, Subcutaneous, Once, Jorge Diana MD    The following portions of the patient's history were reviewed and updated as appropriate: allergies, current medications, past family history, past medical history, past social history, past surgical history and problem list.        Past Surgical History:   Procedure Laterality Date   • BREAST BIOPSY     • BREAST LUMPECTOMY Right 1991   • CATARACT EXTRACTION WITH INTRAOCULAR LENS IMPLANT Right 2008   • CHOLECYSTECTOMY  03/04/2008    Laparoscopic cholecystectomy with operative cholangiogram. Acute cholecystitis   • COLONOSCOPY  2006   • INJECTION OF MEDICATION  10/03/2011    Kenalog (1)    • MAMMO BILATERAL  02/28/2015    Screening   • RENAL ARTERY STENT Right 05/18/2006    5x15mm balloon expandable stent x2            Physical Exam  /80 (BP Location: Left arm, Patient Position: Sitting, Cuff Size: Adult)   Pulse 88   Ht 152.4 cm (60\")   Wt 53.1 kg (117 lb)   SpO2 100%   BMI 22.85 kg/m²     HEENT: No masses soft bruit over the left carotid artery    CHEST: Clear to auscultation    HEART: Tachycardia antibiotic 100-108 per minute but regular.  Last visit her heart rate was listed as 92 she demonstrates slight edema both lower extremities but no shortness of air    ABDOMEN: Nontender    EXTREMITIES: Dorsalis pedis posterior tibial pulses easily palpable bilaterally triphasic wave sounds over those vessels slight edema both lower extremity    VASCULAR LAB STUDIES:DUPLEX SCAN RIGHT RENAL ARTERY (7-16-19)  Interpretation Summary      · Appears to be " a patent RT renal artery stent  · Known atrophy of the left kidney         VASCULAR LAB STUDIES: DUPLEX SCAN RIGHT RENAL ARTERY(7-)      Study Impression     Right Renal Conclusions:  • Right Renal Proximal: Less than 60% stenosis present.   • Right Renal Mid: Less than 60% stenosis present.   • Right Renal Distal: Less than 60% stenosis present.     Interpretation Summary     · Unable to demonstrate adequate color fill within the right renal stent, due to excessive bowel gas and pulsatile venous flow     VASCULAR LAB STUDIES: BILATERAL CAROTID DUPLEX SCAN (7-)                                       RIGHT 0-49%          LEFT 50-69%  Vertebral Flow           Antegrade               Antegrade      RADIOLOGY:      Renal artery stenosis (CMS/HCC) [I70.1]    IMPRESSION:  1.  Satisfactory status previous stent right renal artery 5/18/2006, stage III chronic kidney disease  2.  Known atrophy of the left kidney  3.  0 to 49% stenosis right carotid artery 50 to 69% stenosis left carotid artery with antegrade flow in both vertebral arteries, asymptomatic    4.  Normal arterial perfusion both lower extremities with palpable ankle pulses              Assessment/Plan  Ilda was seen today for follow-up and renal artery stenosis.    Diagnoses and all orders for this visit:    Renal artery stenosis (CMS/HCC)  -     Duplex Renal Artery - Right CAR; Future    Carotid stenosis, asymptomatic, left  -     Duplex Carotid Ultrasound CAR; Future                  Return in about 53 weeks (around 7/27/2021) for duplex right renal artery, carotid duplex scan.                This document has been electronically signed by Jack L. Hamman, MD on July 22, 2020 09:39    Jack L. Hamman, MD  7/22/2020

## 2020-08-27 ENCOUNTER — OFFICE VISIT (OUTPATIENT)
Dept: FAMILY MEDICINE CLINIC | Facility: CLINIC | Age: 80
End: 2020-08-27

## 2020-08-27 VITALS
HEIGHT: 60 IN | HEART RATE: 73 BPM | SYSTOLIC BLOOD PRESSURE: 110 MMHG | BODY MASS INDEX: 22.58 KG/M2 | DIASTOLIC BLOOD PRESSURE: 58 MMHG | WEIGHT: 115 LBS

## 2020-08-27 DIAGNOSIS — N18.30 CHRONIC KIDNEY DISEASE, STAGE 3 (HCC): ICD-10-CM

## 2020-08-27 DIAGNOSIS — E78.00 PURE HYPERCHOLESTEROLEMIA: ICD-10-CM

## 2020-08-27 DIAGNOSIS — N95.1 MENOPAUSAL STATE: ICD-10-CM

## 2020-08-27 DIAGNOSIS — Z12.31 ENCOUNTER FOR SCREENING MAMMOGRAM FOR MALIGNANT NEOPLASM OF BREAST: ICD-10-CM

## 2020-08-27 DIAGNOSIS — C50.919 MALIGNANT NEOPLASM OF FEMALE BREAST, UNSPECIFIED ESTROGEN RECEPTOR STATUS, UNSPECIFIED LATERALITY, UNSPECIFIED SITE OF BREAST (HCC): ICD-10-CM

## 2020-08-27 DIAGNOSIS — I15.0 RENOVASCULAR HYPERTENSION: Primary | ICD-10-CM

## 2020-08-27 DIAGNOSIS — Z00.00 MEDICARE ANNUAL WELLNESS VISIT, SUBSEQUENT: ICD-10-CM

## 2020-08-27 PROCEDURE — 99443 PR PHYS/QHP TELEPHONE EVALUATION 21-30 MIN: CPT | Performed by: FAMILY MEDICINE

## 2020-08-27 PROCEDURE — G0439 PPPS, SUBSEQ VISIT: HCPCS | Performed by: FAMILY MEDICINE

## 2020-08-27 NOTE — PROGRESS NOTES
Subjective   Ilda Leon is a 80 y.o. female.     Hypertension   This is a chronic problem. The current episode started more than 1 year ago. The problem is unchanged. The problem is controlled. Associated symptoms include peripheral edema. Pertinent negatives include no orthopnea, palpitations, PND or shortness of breath.   Hyperlipidemia   This is a chronic problem. The current episode started more than 1 year ago. Recent lipid tests were reviewed and are normal. There are no known factors aggravating her hyperlipidemia. Associated symptoms include myalgias. Pertinent negatives include no shortness of breath.   Arthritis   Presents for follow-up visit. She complains of pain. She reports no stiffness, joint swelling or joint warmth. The symptoms have been stable. Affected locations include the neck. Her pain is at a severity of 8/10.   Chronic Kidney Disease   This is a chronic problem. The current episode started more than 1 year ago. The problem occurs constantly. The problem has been unchanged. Associated symptoms include myalgias. Pertinent negatives include no joint swelling.        The following portions of the patient's history were reviewed and updated as appropriate: allergies, current medications, past family history, past medical history, past social history, past surgical history and problem list.    Review of Systems   Respiratory: Negative for shortness of breath.    Cardiovascular: Negative for palpitations, orthopnea and PND.   Musculoskeletal: Positive for arthritis and myalgias. Negative for joint swelling and stiffness.       Objective   Physical Exam   Constitutional: She is oriented to person, place, and time. No distress.   Neurological: She is alert and oriented to person, place, and time.   Psychiatric: She has a normal mood and affect. Her behavior is normal. Judgment and thought content normal.   Vitals reviewed.      Assessment/Plan   Problems Addressed this Visit         "Cardiovascular and Mediastinum    Hyperlipidemia    Renovascular hypertension - Primary       Genitourinary    Chronic kidney disease, stage 3 (CMS/HCC)       Other    Malignant neoplasm of female breast (CMS/HCC)    Relevant Orders    Mammo Screening Digital Tomosynthesis Bilateral With CAD      Other Visit Diagnoses     Medicare annual wellness visit, subsequent        Menopausal state        Relevant Orders    DEXA Bone Density Axial    Encounter for screening mammogram for malignant neoplasm of breast         Relevant Orders    Mammo Screening Digital Tomosynthesis Bilateral With CAD            You have chosen to receive care through a telephone visit. Do you consent to use a telephone visit for your medical care today? Yes  This visit has been rescheduled as a phone visit to comply with patient safety concerns in accordance with CDC recommendations. Total time of discussion was 21 minutes.            Visit Vitals  /58   Pulse 73   Ht 152.4 cm (60\")   Wt 52.2 kg (115 lb)   BMI 22.46 kg/m²       Body mass index is 22.46 kg/m².            This document has been electronically signed by Jorge Diana MD on August 27, 2020 09:58        Visit Vitals  /58   Pulse 73   Ht 152.4 cm (60\")   Wt 52.2 kg (115 lb)   BMI 22.46 kg/m²       Body mass index is 22.46 kg/m².            This document has been electronically signed by Jorge Diana MD on August 27, 2020 09:58      "

## 2020-08-27 NOTE — PROGRESS NOTES
The ABCs of the Annual Wellness Visit  Subsequent Medicare Wellness Visit    No chief complaint on file.      Subjective   History of Present Illness:  Ilda Leon is a 80 y.o. female who presents for a Subsequent Medicare Wellness Visit.    HEALTH RISK ASSESSMENT    Recent Hospitalizations:  No hospitalization(s) within the last year.    Current Medical Providers:  Patient Care Team:  Jorge Diana MD as PCP - General  Jorge Diana MD as PCP - Claims Attributed  Marcus Mesa MD as Consulting Physician (Nephrology)  Hamman, Jack L, MD as Surgeon (Cardiothoracic Surgery)    Smoking Status:  Social History     Tobacco Use   Smoking Status Never Smoker   Smokeless Tobacco Never Used       Alcohol Consumption:  Social History     Substance and Sexual Activity   Alcohol Use No       Depression Screen:   PHQ-2/PHQ-9 Depression Screening 5/29/2019   Little interest or pleasure in doing things 0   Feeling down, depressed, or hopeless 0   Trouble falling or staying asleep, or sleeping too much -   Feeling tired or having little energy -   Poor appetite or overeating -   Feeling bad about yourself - or that you are a failure or have let yourself or your family down -   Trouble concentrating on things, such as reading the newspaper or watching television -   Moving or speaking so slowly that other people could have noticed. Or the opposite - being so fidgety or restless that you have been moving around a lot more than usual -   Thoughts that you would be better off dead, or of hurting yourself in some way -   Total Score 0   If you checked off any problems, how difficult have these problems made it for you to do your work, take care of things at home, or get along with other people? -       Fall Risk Screen:  STEADI Fall Risk Assessment has not been completed.    Health Habits and Functional and Cognitive Screening:  Functional & Cognitive Status 11/29/2018   Do you have difficulty preparing food and  eating? No   Do you have difficulty bathing yourself, getting dressed or grooming yourself? No   Do you have difficulty using the toilet? No   Do you have difficulty moving around from place to place? No   Do you have trouble with steps or getting out of a bed or a chair? No   Current Diet Well Balanced Diet   Dental Exam Up to date   Eye Exam Up to date   Exercise (times per week) 4 times per week   Current Exercise Activities Include Walking   Do you need help using the phone?  No   Are you deaf or do you have serious difficulty hearing?  Yes   Do you need help with transportation? Yes   Do you need help shopping? No   Do you need help preparing meals?  No   Do you need help with housework?  No   Do you need help with laundry? No   Do you need help taking your medications? No   Do you need help managing money? No   Do you ever drive or ride in a car without wearing a seat belt? No   Have you felt unusual stress, anger or loneliness in the last month? Yes   Who do you live with? Alone   If you need help, do you have trouble finding someone available to you? No   Have you been bothered in the last four weeks by sexual problems? No   Do you have difficulty concentrating, remembering or making decisions? No         Does the patient have evidence of cognitive impairment? No    Asprin use counseling:Does not need ASA (and currently is not on it)    Age-appropriate Screening Schedule:  Refer to the list below for future screening recommendations based on patient's age, sex and/or medical conditions. Orders for these recommended tests are listed in the plan section. The patient has been provided with a written plan.    Health Maintenance   Topic Date Due   • URINE MICROALBUMIN  1940   • HEMOGLOBIN A1C  08/30/2016   • ZOSTER VACCINE (2 of 2) 01/31/2017   • DXA SCAN  04/19/2019   • MAMMOGRAM  01/15/2020   • INFLUENZA VACCINE  08/01/2020   • LIPID PANEL  12/02/2020   • DIABETIC EYE EXAM  12/02/2020   • TDAP/TD VACCINES  (2 - Td) 07/08/2024          The following portions of the patient's history were reviewed and updated as appropriate: allergies, current medications, past family history, past medical history, past social history, past surgical history and problem list.    Outpatient Medications Prior to Visit   Medication Sig Dispense Refill   • acetaminophen (TYLENOL) 325 MG tablet Take 650 mg by mouth Every 6 (Six) Hours As Needed for Mild Pain .     • amLODIPine (NORVASC) 10 MG tablet TAKE 1 TABLET BY MOUTH EVERY DAY 90 tablet 0   • atorvastatin (LIPITOR) 10 MG tablet TAKE 1 TABLET BY MOUTH DAILY. 90 tablet 2   • calcium carbonate (OS-STEVIE) 600 MG tablet Take 600 mg by mouth 2 (Two) Times a Day.     • clopidogrel (PLAVIX) 75 MG tablet Take 1 tablet by mouth Daily. 90 tablet 1   • Coenzyme Q10 (CO Q10) 100 MG capsule Take 300 mg by mouth Daily. 90 each 11   • furosemide (LASIX) 20 MG tablet Take 1 tablet by mouth Daily.     • loratadine (CLARITIN) 10 MG tablet TAKE 1 TABLET BY MOUTH DAILY. 10 tablet 2   • metoprolol tartrate (LOPRESSOR) 25 MG tablet TAKE 1 TABLET BY MOUTH TWICE A  tablet 2   • Unable to find multivits,therap w-fe,hematin oral       Facility-Administered Medications Prior to Visit   Medication Dose Route Frequency Provider Last Rate Last Dose   • denosumab (PROLIA) syringe 60 mg  60 mg Subcutaneous Once Jorge Diana MD           Patient Active Problem List   Diagnosis   • Adjustment disorder with anxious mood   • Anemia   • Artificial lens present   • Atopic conjunctivitis   • Chronic kidney disease, stage 3 (CMS/HCC)   • Cortical senile cataract   • Degenerative joint disease involving multiple joints   • Hyperglycemia   • Hyperlipidemia   • Malignant neoplasm of female breast (CMS/HCC)   • Need for prophylactic vaccination against Streptococcus pneumoniae (pneumococcus)   • Nuclear cataract   • Osteoporosis declined fosamax   • Peripheral vascular disease (CMS/HCC)   • Renal artery stenosis (CMS/HCC)    • Renovascular hypertension   • Restless legs   • History of screening mammography   • Stricture esophagus   • Trigeminal neuralgia   • Vitamin D deficiency   • Acute right-sided thoracic back pain   • Chronic pain of both ankles   • Pain in both feet   • Osteoporosis   • Osteoporosis   • Carotid stenosis, asymptomatic, left       Advanced Care Planning:  ACP discussion was held with the patient during this visit. Patient has an advance directive in EMR which is still valid.     Review of Systems    Compared to one year ago, the patient feels her physical health is the same.  Compared to one year ago, the patient feels her mental health is the same.    Reviewed chart for potential of high risk medication in the elderly: yes  Reviewed chart for potential of harmful drug interactions in the elderly:yes    Objective       There were no vitals filed for this visit.    There is no height or weight on file to calculate BMI.  Discussed the patient's BMI with her. The BMI is in the acceptable range.    Physical Exam          Assessment/Plan   Medicare Risks and Personalized Health Plan  CMS Preventative Services Quick Reference  Advance Directive Discussion    The above risks/problems have been discussed with the patient.  Pertinent information has been shared with the patient in the After Visit Summary.  Follow up plans and orders are seen below in the Assessment/Plan Section.    There are no diagnoses linked to this encounter.  Follow Up:  No follow-ups on file.     An After Visit Summary and PPPS were given to the patient.

## 2020-09-10 RX ORDER — ATORVASTATIN CALCIUM 10 MG/1
TABLET, FILM COATED ORAL
Qty: 90 TABLET | Refills: 2 | Status: SHIPPED | OUTPATIENT
Start: 2020-09-10 | End: 2021-04-22 | Stop reason: SDUPTHER

## 2020-09-14 ENCOUNTER — TELEPHONE (OUTPATIENT)
Dept: FAMILY MEDICINE CLINIC | Facility: CLINIC | Age: 80
End: 2020-09-14

## 2020-09-14 NOTE — TELEPHONE ENCOUNTER
Per Dr. Diana, Ms. Leon has been called with recent DEXA Bone Density results & recommendations.  Continue current medications and follow-up as planned or sooner if any problems.      ----- Message from Jorge Diana MD sent at 9/11/2020 11:17 AM CDT -----  Bone still thin.  Continue Prolia every 6 months

## 2020-10-14 RX ORDER — CLOPIDOGREL BISULFATE 75 MG/1
TABLET ORAL
Qty: 90 TABLET | Refills: 0 | Status: SHIPPED | OUTPATIENT
Start: 2020-10-14 | End: 2021-01-07

## 2020-10-28 RX ORDER — AMLODIPINE BESYLATE 10 MG/1
TABLET ORAL
Qty: 90 TABLET | Refills: 0 | Status: SHIPPED | OUTPATIENT
Start: 2020-10-28 | End: 2021-01-22 | Stop reason: SDUPTHER

## 2021-01-07 RX ORDER — CLOPIDOGREL BISULFATE 75 MG/1
TABLET ORAL
Qty: 90 TABLET | Refills: 0 | Status: SHIPPED | OUTPATIENT
Start: 2021-01-07 | End: 2021-04-06

## 2021-01-22 RX ORDER — AMLODIPINE BESYLATE 10 MG/1
10 TABLET ORAL DAILY
Qty: 90 TABLET | Refills: 0 | Status: SHIPPED | OUTPATIENT
Start: 2021-01-22 | End: 2021-04-21

## 2021-02-05 ENCOUNTER — TELEPHONE (OUTPATIENT)
Dept: FAMILY MEDICINE CLINIC | Facility: CLINIC | Age: 81
End: 2021-02-05

## 2021-02-25 ENCOUNTER — OFFICE VISIT (OUTPATIENT)
Dept: FAMILY MEDICINE CLINIC | Facility: CLINIC | Age: 81
End: 2021-02-25

## 2021-02-25 VITALS
HEART RATE: 72 BPM | OXYGEN SATURATION: 98 % | HEIGHT: 60 IN | WEIGHT: 114 LBS | DIASTOLIC BLOOD PRESSURE: 64 MMHG | SYSTOLIC BLOOD PRESSURE: 126 MMHG | BODY MASS INDEX: 22.38 KG/M2

## 2021-02-25 DIAGNOSIS — Z76.89 ENCOUNTER TO ESTABLISH CARE: ICD-10-CM

## 2021-02-25 DIAGNOSIS — I15.0 RENOVASCULAR HYPERTENSION: Primary | ICD-10-CM

## 2021-02-25 DIAGNOSIS — N18.30 STAGE 3 CHRONIC KIDNEY DISEASE, UNSPECIFIED WHETHER STAGE 3A OR 3B CKD (HCC): ICD-10-CM

## 2021-02-25 PROCEDURE — 99213 OFFICE O/P EST LOW 20 MIN: CPT | Performed by: FAMILY MEDICINE

## 2021-02-25 RX ORDER — LANOLIN ALCOHOL/MO/W.PET/CERES
1000 CREAM (GRAM) TOPICAL DAILY
COMMUNITY

## 2021-04-06 RX ORDER — CLOPIDOGREL BISULFATE 75 MG/1
TABLET ORAL
Qty: 90 TABLET | Refills: 0 | Status: SHIPPED | OUTPATIENT
Start: 2021-04-06 | End: 2021-04-21

## 2021-04-21 RX ORDER — CLOPIDOGREL BISULFATE 75 MG/1
TABLET ORAL
Qty: 90 TABLET | Refills: 0 | Status: SHIPPED | OUTPATIENT
Start: 2021-04-21 | End: 2021-09-22

## 2021-04-21 RX ORDER — AMLODIPINE BESYLATE 10 MG/1
TABLET ORAL
Qty: 90 TABLET | Refills: 0 | Status: SHIPPED | OUTPATIENT
Start: 2021-04-21 | End: 2021-07-19

## 2021-04-22 RX ORDER — ATORVASTATIN CALCIUM 10 MG/1
10 TABLET, FILM COATED ORAL DAILY
Qty: 90 TABLET | Refills: 0 | Status: SHIPPED | OUTPATIENT
Start: 2021-04-22 | End: 2021-08-18

## 2021-05-25 ENCOUNTER — OFFICE VISIT (OUTPATIENT)
Dept: FAMILY MEDICINE CLINIC | Facility: CLINIC | Age: 81
End: 2021-05-25

## 2021-05-25 VITALS
HEIGHT: 60 IN | DIASTOLIC BLOOD PRESSURE: 66 MMHG | HEART RATE: 73 BPM | OXYGEN SATURATION: 99 % | WEIGHT: 118 LBS | BODY MASS INDEX: 23.16 KG/M2 | SYSTOLIC BLOOD PRESSURE: 130 MMHG

## 2021-05-25 DIAGNOSIS — N18.30 STAGE 3 CHRONIC KIDNEY DISEASE, UNSPECIFIED WHETHER STAGE 3A OR 3B CKD (HCC): ICD-10-CM

## 2021-05-25 DIAGNOSIS — I15.0 RENOVASCULAR HYPERTENSION: Primary | ICD-10-CM

## 2021-05-25 PROCEDURE — 99213 OFFICE O/P EST LOW 20 MIN: CPT | Performed by: FAMILY MEDICINE

## 2021-07-19 RX ORDER — AMLODIPINE BESYLATE 10 MG/1
TABLET ORAL
Qty: 90 TABLET | Refills: 1 | Status: SHIPPED | OUTPATIENT
Start: 2021-07-19 | End: 2022-01-19

## 2021-08-02 ENCOUNTER — OFFICE VISIT (OUTPATIENT)
Dept: CARDIAC SURGERY | Facility: CLINIC | Age: 81
End: 2021-08-02

## 2021-08-02 VITALS
OXYGEN SATURATION: 99 % | BODY MASS INDEX: 23.4 KG/M2 | TEMPERATURE: 97.5 F | SYSTOLIC BLOOD PRESSURE: 134 MMHG | HEIGHT: 60 IN | DIASTOLIC BLOOD PRESSURE: 76 MMHG | WEIGHT: 119.2 LBS | HEART RATE: 77 BPM

## 2021-08-02 DIAGNOSIS — N18.30 STAGE 3 CHRONIC KIDNEY DISEASE, UNSPECIFIED WHETHER STAGE 3A OR 3B CKD (HCC): ICD-10-CM

## 2021-08-02 DIAGNOSIS — I70.1 RENAL ARTERY STENOSIS (HCC): ICD-10-CM

## 2021-08-02 DIAGNOSIS — I73.9 PERIPHERAL VASCULAR DISEASE (HCC): ICD-10-CM

## 2021-08-02 DIAGNOSIS — I65.22 CAROTID STENOSIS, ASYMPTOMATIC, LEFT: Primary | ICD-10-CM

## 2021-08-02 DIAGNOSIS — I10 ESSENTIAL HYPERTENSION: ICD-10-CM

## 2021-08-02 DIAGNOSIS — E78.00 PURE HYPERCHOLESTEROLEMIA: ICD-10-CM

## 2021-08-02 DIAGNOSIS — C50.919 MALIGNANT NEOPLASM OF FEMALE BREAST, UNSPECIFIED ESTROGEN RECEPTOR STATUS, UNSPECIFIED LATERALITY, UNSPECIFIED SITE OF BREAST (HCC): ICD-10-CM

## 2021-08-02 PROCEDURE — 99214 OFFICE O/P EST MOD 30 MIN: CPT | Performed by: THORACIC SURGERY (CARDIOTHORACIC VASCULAR SURGERY)

## 2021-08-12 NOTE — PROGRESS NOTES
8/2/2021    Ilda Leon  1940    Chief Complaint:    Chief Complaint   Patient presents with   • Carotid Artery Disease       HPI:      PCP:  Valentina Ulrich MD  Nephrology:  Dr Velazquez    81 y.o. female with HTN(stable, increased risk stroke, rupture), Hyperlipidemia(stable, increased risk cardiovascular events) and Chronic Kidney Disease(stable, increased risk renal failure) , renal artery stenosis(stable, increase risk cardiovascular events), carotid stenosis(stable, increase risk stroke).  never smoked.  Asymptomatic carotid stenosis.  Atrophic LEFT kidney..  No other associated signs, symptoms or modifying factors.    7/2020 Carotid Duplex:  RUIZ 0-49% (100/25cm/s, ratio 1.8), LICA 50-69% (165/33cm/s, ratio 2.8) antegrade verts.  8/2021 Carotid Duplex:  RUIZ 0-49% (81/23cm/s, ratio 1.3), LICA 50-69% (153/23cm/s, ratio 2.5) antegrade verts.    5/2006 RIGHT renal artery stent  12/2016 Renal Artery Duplex:  RIGHT <60% (90/20cm/s, RAR .70), LEFT atrophic.  7/2018 Renal Artery Duplex:  RIGHT <60% (130/30cm/s, RAR .90), LEFT atrophic.  7/2019 Renal Artery Duplex:  RIGHT <60% (146/35cm/s, RAR 1.3), LEFT atrophic.  7/2020 Renal Artery Duplex:  RIGHT <60% (124/27cm/s, RAR .85) patent stent, LEFT atrophic.  8/2021 Renal Artery Duplex:  RIGHT <60% (92/22cm/s, RAR .68) patent stent, LEFT atrophic.      The following portions of the patient's history were reviewed and updated as appropriate: allergies, current medications, past family history, past medical history, past social history, past surgical history and problem list.  Recent images independently reviewed.  Available laboratory values reviewed.    PMH:  Past Medical History:   Diagnosis Date   • Aching leg syndrome    • Acute bronchitis    • Acute serous otitis media    • Acute sinusitis    • Adjustment disorder with anxious mood    • Adverse reaction to drug    • Allergic rhinitis    • Anemia    • Artificial lens present     Artificial lens in position.    • Atopic conjunctivitis    • Breast cyst    • Chronic kidney disease, stage 3 (CMS/HCC)     in the right left non-functional   • Cortical senile cataract    • Degenerative joint disease involving multiple joints    • Disorder of muscle     statin-induced      • Dyslipidemia    • Family history of rheumatic fever 1947   • Hearing loss of left ear    • History of blood clots     in left renal artery resulting in loss of left kidney   • History of mammography, screening 08/06/2015   • History of radiation therapy 1991   • History of shingles 2011   • Hx of radiation therapy    • Hyperglycemia    • Hyperlipidemia    • Hypertension    • Malignant neoplasm of female breast (CMS/HCC)     history of with mastectomy      • Nausea    • Nausea and vomiting    • Need for prophylactic vaccination against Streptococcus pneumoniae (pneumococcus)    • Need for prophylactic vaccination or inoculation against diphtheria-tetanus-pertussis, combined [DTP] [DTaP]    • Non-alcoholic fatty liver disease    • Nuclear cataract    • Osteoporosis    • Pain in back, lumbar region    • Peripheral vascular disease (CMS/HCC)    • Renal artery stenosis (CMS/HCC)    • Renovascular hypertension    • Restless legs    • Screening for osteoporosis    • Stricture of esophagus    • Trigeminal neuralgia    • Upper respiratory infection    • Vitamin D deficiency      Past Surgical History:   Procedure Laterality Date   • BREAST BIOPSY     • BREAST LUMPECTOMY Right 1991   • CATARACT EXTRACTION WITH INTRAOCULAR LENS IMPLANT Right 2008   • CHOLECYSTECTOMY  03/04/2008    Laparoscopic cholecystectomy with operative cholangiogram. Acute cholecystitis   • COLONOSCOPY  2006   • INJECTION OF MEDICATION  10/03/2011    Kenalog (1)    • MAMMO BILATERAL  02/28/2015    Screening   • RENAL ARTERY STENT Right 05/18/2006    5x15mm balloon expandable stent x2      Family History   Problem Relation Age of Onset   • Heart disease Other    • Hypertension Other    • Diabetes  Mother    • Heart disease Mother    • Hyperlipidemia Mother    • Hypertension Mother    • Stroke Father    • Cancer Brother         prostate   • Cancer Sister 60        lung   • Breast cancer Sister      Social History     Tobacco Use   • Smoking status: Never Smoker   • Smokeless tobacco: Never Used   Substance Use Topics   • Alcohol use: No   • Drug use: No       ALLERGIES:  Allergies   Allergen Reactions   • Carbamazepine Unknown - High Severity   • Other Unknown - High Severity     Statins-Hmg-Coa Reductase Inhibitors;     • Sulfa Antibiotics Nausea And Vomiting   • Zithromax [Azithromycin] Nausea And Vomiting   • Allegra [Fexofenadine] Unknown - Low Severity     Unknown Reaction   • Trileptal [Oxcarbazepine] Unknown - High Severity         MEDICATIONS:    Current Outpatient Medications:   •  acetaminophen (TYLENOL) 325 MG tablet, Take 650 mg by mouth Every 6 (Six) Hours As Needed for Mild Pain ., Disp: , Rfl:   •  amLODIPine (NORVASC) 10 MG tablet, TAKE 1 TABLET BY MOUTH EVERY DAY, Disp: 90 tablet, Rfl: 1  •  atorvastatin (LIPITOR) 10 MG tablet, Take 1 tablet by mouth Daily., Disp: 90 tablet, Rfl: 0  •  clopidogrel (PLAVIX) 75 MG tablet, TAKE 1 TABLET BY MOUTH EVERY DAY, Disp: 90 tablet, Rfl: 0  •  furosemide (LASIX) 20 MG tablet, Take 0.5 tablets by mouth Daily., Disp: , Rfl:   •  loratadine (CLARITIN) 10 MG tablet, TAKE 1 TABLET BY MOUTH DAILY., Disp: 10 tablet, Rfl: 2  •  metoprolol tartrate (LOPRESSOR) 25 MG tablet, Take 1 tablet by mouth 2 (Two) Times a Day., Disp: 180 tablet, Rfl: 2  •  Unable to find, multivits,therap w-fe,hematin oral, Disp: , Rfl:   •  vitamin B-12 (CYANOCOBALAMIN) 1000 MCG tablet, Take 1,000 mcg by mouth Daily., Disp: , Rfl:     Current Facility-Administered Medications:   •  denosumab (PROLIA) syringe 60 mg, 60 mg, Subcutaneous, Once, Jorge Diana MD    Review of Systems   Review of Systems   Constitutional: Negative for malaise/fatigue and weight loss.   Cardiovascular:  "Negative for chest pain, claudication and dyspnea on exertion.   Respiratory: Negative for cough and shortness of breath.    Skin: Negative for color change and poor wound healing.   Neurological: Negative for dizziness, numbness and weakness.       Physical Exam   Vitals:    08/02/21 0951 08/02/21 0952   BP: 138/78 134/76   BP Location: Left arm Right arm   Pulse: 77    Temp: 97.5 °F (36.4 °C)    TempSrc: Infrared    SpO2: 99%    Weight: 54.1 kg (119 lb 3.2 oz)    Height: 152.4 cm (60\")      Body surface area is 1.5 meters squared.  Body mass index is 23.28 kg/m².  Physical Exam  Constitutional:       General: She is not in acute distress.     Appearance: She is not ill-appearing.   HENT:      Right Ear: Hearing normal.      Left Ear: Hearing normal.      Nose: No nasal deformity.      Mouth/Throat:      Dentition: Normal dentition. Does not have dentures.   Cardiovascular:      Rate and Rhythm: Normal rate and regular rhythm.      Pulses:           Carotid pulses are 2+ on the right side and 2+ on the left side.       Radial pulses are 2+ on the right side and 2+ on the left side.        Dorsalis pedis pulses are 2+ on the right side and 2+ on the left side.        Posterior tibial pulses are 2+ on the right side and 2+ on the left side.      Heart sounds: No murmur heard.     Pulmonary:      Effort: Pulmonary effort is normal.      Breath sounds: Normal breath sounds.   Abdominal:      General: There is no distension.      Palpations: Abdomen is soft. There is no mass.      Tenderness: There is no abdominal tenderness.   Musculoskeletal:         General: No deformity.      Comments: Gait normal.    Skin:     General: Skin is warm and dry.      Coloration: Skin is not pale.      Findings: No erythema.      Comments: No venous staining   Neurological:      Mental Status: She is alert and oriented to person, place, and time.   Psychiatric:         Speech: Speech normal.         Behavior: Behavior is cooperative.    "      Thought Content: Thought content normal.         Judgment: Judgment normal.         BUN   Date Value Ref Range Status   12/02/2019 22 8 - 23 mg/dL Final     Creatinine   Date Value Ref Range Status   12/02/2019 1.33 (H) 0.57 - 1.00 mg/dL Final     eGFR Non  Amer   Date Value Ref Range Status   12/02/2019 38 (L) >60 mL/min/1.73 Final       ASSESSMENT:  Diagnoses and all orders for this visit:    1. Carotid stenosis, asymptomatic, left (Primary)  -     Duplex Carotid Ultrasound CAR; Future    2. Renal artery stenosis (CMS/HCC)  -     Duplex Renal Artery - Bilateral Complete CAR; Future    3. Peripheral vascular disease (CMS/HCC)    4. Malignant neoplasm of female breast, unspecified estrogen receptor status, unspecified laterality, unspecified site of breast (CMS/HCC)    5. Pure hypercholesterolemia    6. Essential hypertension    7. Stage 3 chronic kidney disease, unspecified whether stage 3a or 3b CKD (CMS/HCC)    PLAN:  Detailed discussion with Ilda Leon regarding situation and options.  Stable carotid stenosis, renal artery stenosis.  Multiple risk factors with severe comorbidities.  No intervention indicated at this time.  Will follow with interval imaging.  Risks, benefits discussed.  Understands and wishes to proceed with plan.    Return after above studies complete  Recommended regular physical activity, progressive walking program.  Continue current medications as directed.  Advance Care Planning   ACP discussion was declined by the patient. Patient has an advance directive in EMR which is still valid.     Thank you for the opportunity to participate in this patient's care.    Copy to primary care provider.

## 2021-08-18 RX ORDER — ATORVASTATIN CALCIUM 10 MG/1
TABLET, FILM COATED ORAL
Qty: 90 TABLET | Refills: 0 | Status: SHIPPED | OUTPATIENT
Start: 2021-08-18 | End: 2021-10-28

## 2021-09-22 RX ORDER — CLOPIDOGREL BISULFATE 75 MG/1
TABLET ORAL
Qty: 90 TABLET | Refills: 0 | Status: SHIPPED | OUTPATIENT
Start: 2021-09-22 | End: 2021-10-28

## 2021-10-25 ENCOUNTER — OFFICE VISIT (OUTPATIENT)
Dept: FAMILY MEDICINE CLINIC | Facility: CLINIC | Age: 81
End: 2021-10-25

## 2021-10-25 ENCOUNTER — LAB (OUTPATIENT)
Dept: LAB | Facility: HOSPITAL | Age: 81
End: 2021-10-25

## 2021-10-25 VITALS
HEART RATE: 70 BPM | DIASTOLIC BLOOD PRESSURE: 76 MMHG | SYSTOLIC BLOOD PRESSURE: 140 MMHG | WEIGHT: 121 LBS | HEIGHT: 60 IN | OXYGEN SATURATION: 97 % | BODY MASS INDEX: 23.75 KG/M2

## 2021-10-25 DIAGNOSIS — I15.0 RENOVASCULAR HYPERTENSION: ICD-10-CM

## 2021-10-25 DIAGNOSIS — N18.31 STAGE 3A CHRONIC KIDNEY DISEASE (HCC): ICD-10-CM

## 2021-10-25 DIAGNOSIS — R73.09 ELEVATED GLUCOSE LEVEL: ICD-10-CM

## 2021-10-25 DIAGNOSIS — Z00.00 MEDICARE ANNUAL WELLNESS VISIT, SUBSEQUENT: Primary | ICD-10-CM

## 2021-10-25 LAB
CHOLEST SERPL-MCNC: 143 MG/DL (ref 0–200)
HBA1C MFR BLD: 5.55 % (ref 4.8–5.6)
HDLC SERPL-MCNC: 60 MG/DL (ref 40–60)
LDLC SERPL CALC-MCNC: 67 MG/DL (ref 0–100)
LDLC/HDLC SERPL: 1.11 {RATIO}
TRIGL SERPL-MCNC: 81 MG/DL (ref 0–150)
VLDLC SERPL-MCNC: 16 MG/DL (ref 5–40)

## 2021-10-25 PROCEDURE — G0439 PPPS, SUBSEQ VISIT: HCPCS | Performed by: FAMILY MEDICINE

## 2021-10-25 PROCEDURE — 36415 COLL VENOUS BLD VENIPUNCTURE: CPT

## 2021-10-25 PROCEDURE — 83036 HEMOGLOBIN GLYCOSYLATED A1C: CPT

## 2021-10-25 PROCEDURE — 1126F AMNT PAIN NOTED NONE PRSNT: CPT | Performed by: FAMILY MEDICINE

## 2021-10-25 PROCEDURE — 1159F MED LIST DOCD IN RCRD: CPT | Performed by: FAMILY MEDICINE

## 2021-10-25 PROCEDURE — 80061 LIPID PANEL: CPT

## 2021-10-25 NOTE — PROGRESS NOTES
The ABCs of the Annual Wellness Visit  Subsequent Medicare Wellness Visit    Chief Complaint   Patient presents with   • Medicare Wellness-subsequent      Subjective    History of Present Illness:  Ilda Leon is a 81 y.o. female who presents for a Subsequent Medicare Wellness Visit.    The following portions of the patient's history were reviewed and   updated as appropriate: allergies, current medications, past family history, past medical history, past social history, past surgical history and problem list.    Compared to one year ago, the patient feels her physical   health is the same.    Compared to one year ago, the patient feels her mental   health is the same.    Recent Hospitalizations:  She was not admitted to the hospital during the last year.       Current Medical Providers:  Patient Care Team:  Valentina Ulrich MD as PCP - General (Family Medicine)  Marcus Mesa MD as Consulting Physician (Nephrology)  Hamman, Jack L, MD as Surgeon (Cardiothoracic Surgery)  Jena Marin MA as Medical Assistant (Family Medicine)  Ho Glez OD as Consulting Physician (Optometry)  Catalino Albarado MD as Surgeon (Cardiothoracic Surgery)    Outpatient Medications Prior to Visit   Medication Sig Dispense Refill   • acetaminophen (TYLENOL) 325 MG tablet Take 650 mg by mouth Every 6 (Six) Hours As Needed for Mild Pain .     • amLODIPine (NORVASC) 10 MG tablet TAKE 1 TABLET BY MOUTH EVERY DAY 90 tablet 1   • atorvastatin (LIPITOR) 10 MG tablet TAKE 1 TABLET BY MOUTH EVERY DAY 90 tablet 0   • clopidogrel (PLAVIX) 75 MG tablet TAKE 1 TABLET BY MOUTH EVERY DAY 90 tablet 0   • furosemide (LASIX) 20 MG tablet Take 0.5 tablets by mouth Daily.     • loratadine (CLARITIN) 10 MG tablet TAKE 1 TABLET BY MOUTH DAILY. 10 tablet 2   • metoprolol tartrate (LOPRESSOR) 25 MG tablet Take 1 tablet by mouth 2 (Two) Times a Day. 180 tablet 2   • Unable to find multivits,therap w-fe,hematin oral     • vitamin B-12  "(CYANOCOBALAMIN) 1000 MCG tablet Take 1,000 mcg by mouth Daily.       Facility-Administered Medications Prior to Visit   Medication Dose Route Frequency Provider Last Rate Last Admin   • denosumab (PROLIA) syringe 60 mg  60 mg Subcutaneous Once Jorge Diana MD           No opioid medication identified on active medication list. I have reviewed chart for other potential  high risk medication/s and harmful drug interactions in the elderly.          Aspirin is not on active medication list.  Aspirin use is not indicated based on review of current medical condition/s. Risk of harm outweighs potential benefits.      Patient Active Problem List   Diagnosis   • Adjustment disorder with anxious mood   • Anemia   • Artificial lens present   • Atopic conjunctivitis   • Chronic kidney disease, stage 3 (HCC)   • Cortical senile cataract   • Degenerative joint disease involving multiple joints   • Hyperglycemia   • Hyperlipidemia   • Malignant neoplasm of female breast (HCC)   • Need for prophylactic vaccination against Streptococcus pneumoniae (pneumococcus)   • Nuclear cataract   • Osteoporosis declined fosamax   • Peripheral vascular disease (HCC)   • Renal artery stenosis (HCC)   • Renovascular hypertension   • Restless legs   • History of screening mammography   • Stricture esophagus   • Trigeminal neuralgia   • Vitamin D deficiency   • Acute right-sided thoracic back pain   • Chronic pain of both ankles   • Pain in both feet   • Osteoporosis   • Osteoporosis   • Carotid stenosis, asymptomatic, left   • Hypertension     Advance Care Planning  Advance Directive is on file.  ACP discussion was held with the patient during this visit. Patient has an advance directive in EMR which is still valid.           Objective    Vitals:    10/25/21 0959   BP: 140/76   Pulse: 70   SpO2: 97%   Weight: 54.9 kg (121 lb)   Height: 152.4 cm (60\")   PainSc: 0-No pain     BMI Readings from Last 1 Encounters:   10/25/21 23.63 kg/m²   BMI is " within normal parameters. No follow-up required.    Does the patient have evidence of cognitive impairment? No    Physical Exam  Vitals reviewed.   Constitutional:       General: She is not in acute distress.     Appearance: She is well-developed.   HENT:      Head: Normocephalic and atraumatic.   Cardiovascular:      Rate and Rhythm: Normal rate and regular rhythm.      Heart sounds: Normal heart sounds. No murmur heard.      Pulmonary:      Effort: Pulmonary effort is normal. No respiratory distress.      Breath sounds: Normal breath sounds. No wheezing or rales.   Abdominal:      Palpations: Abdomen is soft.      Tenderness: There is no abdominal tenderness.   Musculoskeletal:      Cervical back: Neck supple.   Skin:     General: Skin is warm and dry.   Neurological:      Mental Status: She is alert and oriented to person, place, and time.                 HEALTH RISK ASSESSMENT    Smoking Status:  Social History     Tobacco Use   Smoking Status Never Smoker   Smokeless Tobacco Never Used     Alcohol Consumption:  Social History     Substance and Sexual Activity   Alcohol Use No     Fall Risk Screen:    [facility], and patient is at MODERATE risk for falls. Assessment completed on:2/25/2021    Depression Screening:  PHQ-2/PHQ-9 Depression Screening 10/25/2021   Little interest or pleasure in doing things 0   Feeling down, depressed, or hopeless 0   Trouble falling or staying asleep, or sleeping too much 1   Feeling tired or having little energy 1   Poor appetite or overeating 0   Feeling bad about yourself - or that you are a failure or have let yourself or your family down 0   Trouble concentrating on things, such as reading the newspaper or watching television 1   Moving or speaking so slowly that other people could have noticed. Or the opposite - being so fidgety or restless that you have been moving around a lot more than usual 0   Thoughts that you would be better off dead, or  of hurting yourself in some way 0   Total Score 3   If you checked off any problems, how difficult have these problems made it for you to do your work, take care of things at home, or get along with other people? Not difficult at all       Health Habits and Functional and Cognitive Screening:  Functional & Cognitive Status 10/25/2021   Do you have difficulty preparing food and eating? No   Do you have difficulty bathing yourself, getting dressed or grooming yourself? No   Do you have difficulty using the toilet? No   Do you have difficulty moving around from place to place? No   Do you have trouble with steps or getting out of a bed or a chair? No   Current Diet Other        Current Diet Comment low sodium   Dental Exam Not up to date   Eye Exam Not up to date   Exercise (times per week) 3 times per week   Current Exercises Include Walking   Current Exercise Activities Include -   Do you need help using the phone?  No   Are you deaf or do you have serious difficulty hearing?  No   Do you need help with transportation? No   Do you need help shopping? No   Do you need help preparing meals?  No   Do you need help with housework?  No   Do you need help with laundry? No   Do you need help taking your medications? No   Do you need help managing money? No   Do you ever drive or ride in a car without wearing a seat belt? No   Have you felt unusual stress, anger or loneliness in the last month? No   Who do you live with? Alone   If you need help, do you have trouble finding someone available to you? No   Have you been bothered in the last four weeks by sexual problems? No   Do you have difficulty concentrating, remembering or making decisions? No       Age-appropriate Screening Schedule:  Refer to the list below for future screening recommendations based on patient's age, sex and/or medical conditions. Orders for these recommended tests are listed in the plan section. The patient has been provided with a written  plan.    Health Maintenance   Topic Date Due   • ZOSTER VACCINE (2 of 2) 01/31/2017   • MAMMOGRAM  09/10/2021   • LIPID PANEL  08/26/2022   • DXA SCAN  09/10/2022   • TDAP/TD VACCINES (2 - Td or Tdap) 07/08/2024   • INFLUENZA VACCINE  Completed              Assessment/Plan   CMS Preventative Services Quick Reference  Risk Factors Identified During Encounter  Depression/Dysphoria  Fall Risk-High or Moderate  Inadequate Social Support, Isolation, Loneliness, Lack of Transportation, Financial Difficulties, or Caregiver Stress   The above risks/problems have been discussed with the patient.  Follow up actions/plans if indicated are seen below in the Assessment/Plan Section.  Pertinent information has been shared with the patient in the After Visit Summary.    Diagnoses and all orders for this visit:    1. Medicare annual wellness visit, subsequent (Primary)    2. Renovascular hypertension    3. Stage 3a chronic kidney disease (HCC)    4. Elevated glucose level  -     Hemoglobin A1c; Future      Medicare wellness exam completed  Blood pressure controlled, continue current medications and home monitoring  Had elevated glucose level on last blood work, will check hemoglobin A1c  Patient has remote history of breast cancer  Risk and benefits of mammogram screening discussed  Patient uncertain if she would like to continue mammogram screenings, will consider and discuss again at next visit      Follow Up:   Return in about 6 months (around 4/25/2022) for Recheck.     An After Visit Summary and PPPS were made available to the patient.                   This document has been electronically signed by Valentina Ulrich MD

## 2021-10-25 NOTE — PATIENT INSTRUCTIONS
Medicare Wellness  Personal Prevention Plan of Service     Date of Office Visit:  10/25/2021  Encounter Provider:  Valentina Ulrich MD  Place of Service:  [facility] C[facility]  Patient Name: Ilda Leon  :  1940    As part of the Medicare Wellness portion of your visit today, we are providing you with this personalized preventive plan of services (PPPS). This plan is based upon recommendations of the [place] Preventive Services Task Force (USPSTF) and the Advisory Committee on Immunization Practices (ACIP).    This lists the preventive care services that should be considered, and provides dates of when you are due. Items listed as completed are up-to-date and do not require any further intervention.    Health Maintenance   Topic Date Due   • ZOSTER VACCINE (2 of 2) 2017   • ANNUAL WELLNESS VISIT  2021   • MAMMOGRAM  09/10/2021   • LIPID PANEL  2022   • DXA SCAN  09/10/2022   • TDAP/TD VACCINES (2 - Td or Tdap) 2024   • COVID-19 Vaccine  Completed   • INFLUENZA VACCINE  Completed   • Pneumococcal Vaccine 65+  Completed       No orders of the defined types were placed in this encounter.      Return in about 6 months (around 2022) for Recheck.

## 2021-10-26 ENCOUNTER — TELEPHONE (OUTPATIENT)
Dept: FAMILY MEDICINE CLINIC | Facility: CLINIC | Age: 81
End: 2021-10-26

## 2021-10-26 NOTE — PROGRESS NOTES
Per Dr. Ulrich, Ms. Leon has been called with recent lab results & recommendations.  Continue current medications and follow-up as planned or sooner if any problems.

## 2021-10-26 NOTE — TELEPHONE ENCOUNTER
Per Dr. Ulrich, Ms. Leon has been called with recent lab results & recommendations.  Continue current medications and follow-up as planned or sooner if any problems.       ----- Message from Valentina Ulrich MD sent at 10/26/2021  5:33 AM CDT -----  Please let patient know HgbA1c and cholesterol are ok.  ThanksSEBASTIAN

## 2021-10-28 RX ORDER — ATORVASTATIN CALCIUM 10 MG/1
TABLET, FILM COATED ORAL
Qty: 90 TABLET | Refills: 0 | Status: SHIPPED | OUTPATIENT
Start: 2021-10-28 | End: 2022-01-24

## 2021-10-28 RX ORDER — CLOPIDOGREL BISULFATE 75 MG/1
TABLET ORAL
Qty: 90 TABLET | Refills: 0 | Status: SHIPPED | OUTPATIENT
Start: 2021-10-28 | End: 2022-01-24

## 2022-01-19 RX ORDER — AMLODIPINE BESYLATE 10 MG/1
TABLET ORAL
Qty: 90 TABLET | Refills: 1 | Status: SHIPPED | OUTPATIENT
Start: 2022-01-19 | End: 2022-06-03

## 2022-01-24 RX ORDER — CLOPIDOGREL BISULFATE 75 MG/1
TABLET ORAL
Qty: 90 TABLET | Refills: 0 | Status: SHIPPED | OUTPATIENT
Start: 2022-01-24 | End: 2022-07-25

## 2022-01-24 RX ORDER — ATORVASTATIN CALCIUM 10 MG/1
TABLET, FILM COATED ORAL
Qty: 90 TABLET | Refills: 0 | Status: SHIPPED | OUTPATIENT
Start: 2022-01-24 | End: 2022-06-03

## 2022-04-04 ENCOUNTER — HOSPITAL ENCOUNTER (OUTPATIENT)
Dept: ULTRASOUND IMAGING | Facility: HOSPITAL | Age: 82
Discharge: HOME | End: 2022-04-04
Admitting: INTERNAL MEDICINE

## 2022-04-04 DIAGNOSIS — N17.9 ACUTE RENAL FAILURE, UNSPECIFIED ACUTE RENAL FAILURE TYPE: ICD-10-CM

## 2022-04-04 PROCEDURE — 76775 US EXAM ABDO BACK WALL LIM: CPT

## 2022-04-27 ENCOUNTER — APPOINTMENT (OUTPATIENT)
Dept: CT IMAGING | Facility: HOSPITAL | Age: 82
End: 2022-04-27

## 2022-04-27 ENCOUNTER — HOSPITAL ENCOUNTER (OUTPATIENT)
Facility: HOSPITAL | Age: 82
Setting detail: OBSERVATION
Discharge: HOME HEALTH | End: 2022-04-29
Attending: EMERGENCY MEDICINE | Admitting: INTERNAL MEDICINE

## 2022-04-27 ENCOUNTER — APPOINTMENT (OUTPATIENT)
Dept: GENERAL RADIOLOGY | Facility: HOSPITAL | Age: 82
End: 2022-04-27

## 2022-04-27 DIAGNOSIS — Z78.9 IMPAIRED MOBILITY AND ADLS: ICD-10-CM

## 2022-04-27 DIAGNOSIS — Z74.09 IMPAIRED FUNCTIONAL MOBILITY, BALANCE, GAIT, AND ENDURANCE: ICD-10-CM

## 2022-04-27 DIAGNOSIS — I16.0 HYPERTENSIVE URGENCY: Primary | ICD-10-CM

## 2022-04-27 DIAGNOSIS — Z74.09 IMPAIRED MOBILITY AND ADLS: ICD-10-CM

## 2022-04-27 LAB
ALBUMIN SERPL-MCNC: 4.2 G/DL (ref 3.5–5.2)
ALBUMIN/GLOB SERPL: 1.2 G/DL
ALP SERPL-CCNC: 120 U/L (ref 39–117)
ALT SERPL W P-5'-P-CCNC: 10 U/L (ref 1–33)
ANION GAP SERPL CALCULATED.3IONS-SCNC: 15 MMOL/L (ref 5–15)
AST SERPL-CCNC: 21 U/L (ref 1–32)
BASOPHILS # BLD AUTO: 0.06 10*3/MM3 (ref 0–0.2)
BASOPHILS NFR BLD AUTO: 0.7 % (ref 0–1.5)
BILIRUB SERPL-MCNC: 0.4 MG/DL (ref 0–1.2)
BILIRUB UR QL STRIP: NEGATIVE
BUN SERPL-MCNC: 44 MG/DL (ref 8–23)
BUN/CREAT SERPL: 17.1 (ref 7–25)
CALCIUM SPEC-SCNC: 9.6 MG/DL (ref 8.6–10.5)
CHLORIDE SERPL-SCNC: 101 MMOL/L (ref 98–107)
CLARITY UR: CLEAR
CO2 SERPL-SCNC: 20 MMOL/L (ref 22–29)
COLOR UR: YELLOW
CREAT SERPL-MCNC: 2.58 MG/DL (ref 0.57–1)
DEPRECATED RDW RBC AUTO: 46.5 FL (ref 37–54)
EGFRCR SERPLBLD CKD-EPI 2021: 18 ML/MIN/1.73
EOSINOPHIL # BLD AUTO: 0.29 10*3/MM3 (ref 0–0.4)
EOSINOPHIL NFR BLD AUTO: 3.4 % (ref 0.3–6.2)
ERYTHROCYTE [DISTWIDTH] IN BLOOD BY AUTOMATED COUNT: 13.4 % (ref 12.3–15.4)
FLUAV RNA RESP QL NAA+PROBE: NOT DETECTED
FLUBV RNA RESP QL NAA+PROBE: NOT DETECTED
GLOBULIN UR ELPH-MCNC: 3.4 GM/DL
GLUCOSE SERPL-MCNC: 99 MG/DL (ref 65–99)
GLUCOSE UR STRIP-MCNC: NEGATIVE MG/DL
HCT VFR BLD AUTO: 35.7 % (ref 34–46.6)
HGB BLD-MCNC: 12.1 G/DL (ref 12–15.9)
HGB UR QL STRIP.AUTO: NEGATIVE
HOLD SPECIMEN: NORMAL
IMM GRANULOCYTES # BLD AUTO: 0.03 10*3/MM3 (ref 0–0.05)
IMM GRANULOCYTES NFR BLD AUTO: 0.4 % (ref 0–0.5)
KETONES UR QL STRIP: ABNORMAL
LEUKOCYTE ESTERASE UR QL STRIP.AUTO: NEGATIVE
LYMPHOCYTES # BLD AUTO: 2.41 10*3/MM3 (ref 0.7–3.1)
LYMPHOCYTES NFR BLD AUTO: 28.4 % (ref 19.6–45.3)
MCH RBC QN AUTO: 32.3 PG (ref 26.6–33)
MCHC RBC AUTO-ENTMCNC: 33.9 G/DL (ref 31.5–35.7)
MCV RBC AUTO: 95.2 FL (ref 79–97)
MONOCYTES # BLD AUTO: 0.76 10*3/MM3 (ref 0.1–0.9)
MONOCYTES NFR BLD AUTO: 9 % (ref 5–12)
NEUTROPHILS NFR BLD AUTO: 4.93 10*3/MM3 (ref 1.7–7)
NEUTROPHILS NFR BLD AUTO: 58.1 % (ref 42.7–76)
NITRITE UR QL STRIP: NEGATIVE
NRBC BLD AUTO-RTO: 0 /100 WBC (ref 0–0.2)
NT-PROBNP SERPL-MCNC: 788.5 PG/ML (ref 0–1800)
PH UR STRIP.AUTO: <=5 [PH] (ref 5–9)
PLATELET # BLD AUTO: 303 10*3/MM3 (ref 140–450)
PMV BLD AUTO: 10.6 FL (ref 6–12)
POTASSIUM SERPL-SCNC: 4.1 MMOL/L (ref 3.5–5.2)
PROT SERPL-MCNC: 7.6 G/DL (ref 6–8.5)
PROT UR QL STRIP: ABNORMAL
RBC # BLD AUTO: 3.75 10*6/MM3 (ref 3.77–5.28)
SARS-COV-2 RNA RESP QL NAA+PROBE: NOT DETECTED
SODIUM SERPL-SCNC: 136 MMOL/L (ref 136–145)
SP GR UR STRIP: 1.01 (ref 1–1.03)
TROPONIN T SERPL-MCNC: <0.01 NG/ML (ref 0–0.03)
UROBILINOGEN UR QL STRIP: ABNORMAL
WBC NRBC COR # BLD: 8.48 10*3/MM3 (ref 3.4–10.8)
WHOLE BLOOD HOLD SPECIMEN: NORMAL

## 2022-04-27 PROCEDURE — 85025 COMPLETE CBC W/AUTO DIFF WBC: CPT

## 2022-04-27 PROCEDURE — 84484 ASSAY OF TROPONIN QUANT: CPT

## 2022-04-27 PROCEDURE — 81003 URINALYSIS AUTO W/O SCOPE: CPT | Performed by: NURSE PRACTITIONER

## 2022-04-27 PROCEDURE — 93010 ELECTROCARDIOGRAM REPORT: CPT | Performed by: INTERNAL MEDICINE

## 2022-04-27 PROCEDURE — 83880 ASSAY OF NATRIURETIC PEPTIDE: CPT

## 2022-04-27 PROCEDURE — G0378 HOSPITAL OBSERVATION PER HR: HCPCS

## 2022-04-27 PROCEDURE — 71046 X-RAY EXAM CHEST 2 VIEWS: CPT

## 2022-04-27 PROCEDURE — C9803 HOPD COVID-19 SPEC COLLECT: HCPCS

## 2022-04-27 PROCEDURE — 70450 CT HEAD/BRAIN W/O DYE: CPT

## 2022-04-27 PROCEDURE — 80053 COMPREHEN METABOLIC PANEL: CPT

## 2022-04-27 PROCEDURE — 87636 SARSCOV2 & INF A&B AMP PRB: CPT | Performed by: NURSE PRACTITIONER

## 2022-04-27 PROCEDURE — 96372 THER/PROPH/DIAG INJ SC/IM: CPT

## 2022-04-27 PROCEDURE — 99284 EMERGENCY DEPT VISIT MOD MDM: CPT

## 2022-04-27 PROCEDURE — 25010000002 HEPARIN (PORCINE) PER 1000 UNITS: Performed by: INTERNAL MEDICINE

## 2022-04-27 PROCEDURE — 36415 COLL VENOUS BLD VENIPUNCTURE: CPT

## 2022-04-27 PROCEDURE — 93005 ELECTROCARDIOGRAM TRACING: CPT | Performed by: EMERGENCY MEDICINE

## 2022-04-27 PROCEDURE — 96360 HYDRATION IV INFUSION INIT: CPT

## 2022-04-27 RX ORDER — SODIUM CHLORIDE 0.9 % (FLUSH) 0.9 %
10 SYRINGE (ML) INJECTION AS NEEDED
Status: DISCONTINUED | OUTPATIENT
Start: 2022-04-27 | End: 2022-04-29 | Stop reason: HOSPADM

## 2022-04-27 RX ORDER — AMLODIPINE BESYLATE 10 MG/1
10 TABLET ORAL DAILY
Status: DISCONTINUED | OUTPATIENT
Start: 2022-04-28 | End: 2022-04-29 | Stop reason: HOSPADM

## 2022-04-27 RX ORDER — HEPARIN SODIUM 5000 [USP'U]/ML
5000 INJECTION, SOLUTION INTRAVENOUS; SUBCUTANEOUS EVERY 12 HOURS SCHEDULED
Status: DISCONTINUED | OUTPATIENT
Start: 2022-04-27 | End: 2022-04-29 | Stop reason: HOSPADM

## 2022-04-27 RX ORDER — METOPROLOL TARTRATE 50 MG/1
50 TABLET, FILM COATED ORAL 2 TIMES DAILY
Status: DISCONTINUED | OUTPATIENT
Start: 2022-04-27 | End: 2022-04-29 | Stop reason: HOSPADM

## 2022-04-27 RX ORDER — CETIRIZINE HYDROCHLORIDE 5 MG/1
5 TABLET ORAL DAILY
Status: DISCONTINUED | OUTPATIENT
Start: 2022-04-28 | End: 2022-04-29 | Stop reason: HOSPADM

## 2022-04-27 RX ORDER — LANOLIN ALCOHOL/MO/W.PET/CERES
1000 CREAM (GRAM) TOPICAL DAILY
Status: DISCONTINUED | OUTPATIENT
Start: 2022-04-28 | End: 2022-04-29 | Stop reason: HOSPADM

## 2022-04-27 RX ORDER — ACETAMINOPHEN 325 MG/1
650 TABLET ORAL EVERY 6 HOURS PRN
Status: DISCONTINUED | OUTPATIENT
Start: 2022-04-27 | End: 2022-04-29 | Stop reason: HOSPADM

## 2022-04-27 RX ORDER — LABETALOL HYDROCHLORIDE 5 MG/ML
10 INJECTION, SOLUTION INTRAVENOUS EVERY 6 HOURS PRN
Status: DISCONTINUED | OUTPATIENT
Start: 2022-04-27 | End: 2022-04-28

## 2022-04-27 RX ORDER — HYDRALAZINE HYDROCHLORIDE 20 MG/ML
10 INJECTION INTRAMUSCULAR; INTRAVENOUS ONCE
Status: DISCONTINUED | OUTPATIENT
Start: 2022-04-27 | End: 2022-04-29 | Stop reason: HOSPADM

## 2022-04-27 RX ORDER — ATORVASTATIN CALCIUM 10 MG/1
10 TABLET, FILM COATED ORAL DAILY
Status: DISCONTINUED | OUTPATIENT
Start: 2022-04-28 | End: 2022-04-29 | Stop reason: HOSPADM

## 2022-04-27 RX ORDER — SODIUM CHLORIDE 0.9 % (FLUSH) 0.9 %
10 SYRINGE (ML) INJECTION EVERY 12 HOURS SCHEDULED
Status: DISCONTINUED | OUTPATIENT
Start: 2022-04-27 | End: 2022-04-29 | Stop reason: HOSPADM

## 2022-04-27 RX ORDER — SODIUM CHLORIDE 9 MG/ML
60 INJECTION, SOLUTION INTRAVENOUS CONTINUOUS
Status: DISCONTINUED | OUTPATIENT
Start: 2022-04-27 | End: 2022-04-28

## 2022-04-27 RX ORDER — CLOPIDOGREL BISULFATE 75 MG/1
75 TABLET ORAL DAILY
Status: DISCONTINUED | OUTPATIENT
Start: 2022-04-28 | End: 2022-04-29 | Stop reason: HOSPADM

## 2022-04-27 RX ADMIN — SODIUM CHLORIDE 60 ML: 9 INJECTION, SOLUTION INTRAVENOUS at 23:44

## 2022-04-27 RX ADMIN — METOPROLOL TARTRATE 50 MG: 50 TABLET, FILM COATED ORAL at 23:48

## 2022-04-27 RX ADMIN — HEPARIN SODIUM 5000 UNITS: 5000 INJECTION, SOLUTION INTRAVENOUS; SUBCUTANEOUS at 23:48

## 2022-04-27 RX ADMIN — Medication 10 ML: at 23:47

## 2022-04-27 RX ADMIN — SODIUM CHLORIDE 500 ML: 9 INJECTION, SOLUTION INTRAVENOUS at 20:05

## 2022-04-28 LAB
ALBUMIN SERPL-MCNC: 3.7 G/DL (ref 3.5–5.2)
ALBUMIN/GLOB SERPL: 1.3 G/DL
ALP SERPL-CCNC: 106 U/L (ref 39–117)
ALT SERPL W P-5'-P-CCNC: 9 U/L (ref 1–33)
ANION GAP SERPL CALCULATED.3IONS-SCNC: 14 MMOL/L (ref 5–15)
AST SERPL-CCNC: 21 U/L (ref 1–32)
BILIRUB SERPL-MCNC: 0.4 MG/DL (ref 0–1.2)
BUN SERPL-MCNC: 38 MG/DL (ref 8–23)
BUN/CREAT SERPL: 16.9 (ref 7–25)
CALCIUM SPEC-SCNC: 9.1 MG/DL (ref 8.6–10.5)
CHLORIDE SERPL-SCNC: 108 MMOL/L (ref 98–107)
CO2 SERPL-SCNC: 19 MMOL/L (ref 22–29)
CREAT SERPL-MCNC: 2.25 MG/DL (ref 0.57–1)
DEPRECATED RDW RBC AUTO: 47.1 FL (ref 37–54)
EGFRCR SERPLBLD CKD-EPI 2021: 21.2 ML/MIN/1.73
ERYTHROCYTE [DISTWIDTH] IN BLOOD BY AUTOMATED COUNT: 13.6 % (ref 12.3–15.4)
GLOBULIN UR ELPH-MCNC: 2.8 GM/DL
GLUCOSE SERPL-MCNC: 103 MG/DL (ref 65–99)
HBA1C MFR BLD: 5.5 % (ref 4.8–5.6)
HCT VFR BLD AUTO: 33.4 % (ref 34–46.6)
HGB BLD-MCNC: 11.1 G/DL (ref 12–15.9)
MAGNESIUM SERPL-MCNC: 2.2 MG/DL (ref 1.6–2.4)
MCH RBC QN AUTO: 31.6 PG (ref 26.6–33)
MCHC RBC AUTO-ENTMCNC: 33.2 G/DL (ref 31.5–35.7)
MCV RBC AUTO: 95.2 FL (ref 79–97)
PLATELET # BLD AUTO: 245 10*3/MM3 (ref 140–450)
PMV BLD AUTO: 11.1 FL (ref 6–12)
POTASSIUM SERPL-SCNC: 4.3 MMOL/L (ref 3.5–5.2)
PROT SERPL-MCNC: 6.5 G/DL (ref 6–8.5)
QT INTERVAL: 368 MS
QTC INTERVAL: 483 MS
RBC # BLD AUTO: 3.51 10*6/MM3 (ref 3.77–5.28)
SODIUM SERPL-SCNC: 141 MMOL/L (ref 136–145)
T4 FREE SERPL-MCNC: 1.4 NG/DL (ref 0.93–1.7)
TROPONIN T SERPL-MCNC: <0.01 NG/ML (ref 0–0.03)
TSH SERPL DL<=0.05 MIU/L-ACNC: 3.41 UIU/ML (ref 0.27–4.2)
WBC NRBC COR # BLD: 8.65 10*3/MM3 (ref 3.4–10.8)

## 2022-04-28 PROCEDURE — 25010000002 HEPARIN (PORCINE) PER 1000 UNITS: Performed by: INTERNAL MEDICINE

## 2022-04-28 PROCEDURE — 84443 ASSAY THYROID STIM HORMONE: CPT | Performed by: INTERNAL MEDICINE

## 2022-04-28 PROCEDURE — 84439 ASSAY OF FREE THYROXINE: CPT | Performed by: INTERNAL MEDICINE

## 2022-04-28 PROCEDURE — 97162 PT EVAL MOD COMPLEX 30 MIN: CPT

## 2022-04-28 PROCEDURE — G0378 HOSPITAL OBSERVATION PER HR: HCPCS

## 2022-04-28 PROCEDURE — 96372 THER/PROPH/DIAG INJ SC/IM: CPT

## 2022-04-28 PROCEDURE — 97165 OT EVAL LOW COMPLEX 30 MIN: CPT

## 2022-04-28 PROCEDURE — 83036 HEMOGLOBIN GLYCOSYLATED A1C: CPT | Performed by: INTERNAL MEDICINE

## 2022-04-28 PROCEDURE — 80053 COMPREHEN METABOLIC PANEL: CPT | Performed by: INTERNAL MEDICINE

## 2022-04-28 PROCEDURE — 84484 ASSAY OF TROPONIN QUANT: CPT | Performed by: INTERNAL MEDICINE

## 2022-04-28 PROCEDURE — 85027 COMPLETE CBC AUTOMATED: CPT | Performed by: INTERNAL MEDICINE

## 2022-04-28 PROCEDURE — 83735 ASSAY OF MAGNESIUM: CPT | Performed by: INTERNAL MEDICINE

## 2022-04-28 RX ORDER — SODIUM CHLORIDE 9 MG/ML
60 INJECTION, SOLUTION INTRAVENOUS CONTINUOUS
Status: DISCONTINUED | OUTPATIENT
Start: 2022-04-28 | End: 2022-04-29

## 2022-04-28 RX ORDER — HYDRALAZINE HYDROCHLORIDE 20 MG/ML
10 INJECTION INTRAMUSCULAR; INTRAVENOUS EVERY 6 HOURS PRN
Status: DISCONTINUED | OUTPATIENT
Start: 2022-04-28 | End: 2022-04-29 | Stop reason: HOSPADM

## 2022-04-28 RX ADMIN — HEPARIN SODIUM 5000 UNITS: 5000 INJECTION, SOLUTION INTRAVENOUS; SUBCUTANEOUS at 10:22

## 2022-04-28 RX ADMIN — HEPARIN SODIUM 5000 UNITS: 5000 INJECTION, SOLUTION INTRAVENOUS; SUBCUTANEOUS at 20:10

## 2022-04-28 RX ADMIN — CLOPIDOGREL BISULFATE 75 MG: 75 TABLET ORAL at 10:21

## 2022-04-28 RX ADMIN — AMLODIPINE BESYLATE 10 MG: 10 TABLET ORAL at 10:21

## 2022-04-28 RX ADMIN — METOPROLOL TARTRATE 50 MG: 50 TABLET, FILM COATED ORAL at 10:22

## 2022-04-28 RX ADMIN — CETIRIZINE HYDROCHLORIDE 5 MG: 5 TABLET ORAL at 10:21

## 2022-04-28 RX ADMIN — METOPROLOL TARTRATE 50 MG: 50 TABLET, FILM COATED ORAL at 20:10

## 2022-04-28 RX ADMIN — ATORVASTATIN CALCIUM 10 MG: 10 TABLET, FILM COATED ORAL at 10:21

## 2022-04-28 RX ADMIN — ACETAMINOPHEN 650 MG: 325 TABLET, FILM COATED ORAL at 23:15

## 2022-04-28 RX ADMIN — CYANOCOBALAMIN TAB 1000 MCG 1000 MCG: 1000 TAB at 10:21

## 2022-04-29 ENCOUNTER — HOME HEALTH ADMISSION (OUTPATIENT)
Dept: HOME HEALTH SERVICES | Facility: HOME HEALTHCARE | Age: 82
End: 2022-04-29

## 2022-04-29 ENCOUNTER — READMISSION MANAGEMENT (OUTPATIENT)
Dept: CALL CENTER | Facility: HOSPITAL | Age: 82
End: 2022-04-29

## 2022-04-29 VITALS
DIASTOLIC BLOOD PRESSURE: 70 MMHG | HEIGHT: 60 IN | HEART RATE: 69 BPM | OXYGEN SATURATION: 97 % | SYSTOLIC BLOOD PRESSURE: 147 MMHG | WEIGHT: 112.3 LBS | RESPIRATION RATE: 18 BRPM | BODY MASS INDEX: 22.05 KG/M2 | TEMPERATURE: 97.7 F

## 2022-04-29 PROCEDURE — 25010000002 HEPARIN (PORCINE) PER 1000 UNITS: Performed by: INTERNAL MEDICINE

## 2022-04-29 PROCEDURE — G0378 HOSPITAL OBSERVATION PER HR: HCPCS

## 2022-04-29 PROCEDURE — 96372 THER/PROPH/DIAG INJ SC/IM: CPT

## 2022-04-29 RX ORDER — METOPROLOL TARTRATE 50 MG/1
50 TABLET, FILM COATED ORAL 2 TIMES DAILY
Qty: 30 TABLET | Refills: 2 | Status: SHIPPED | OUTPATIENT
Start: 2022-04-29

## 2022-04-29 RX ADMIN — CLOPIDOGREL BISULFATE 75 MG: 75 TABLET ORAL at 08:52

## 2022-04-29 RX ADMIN — AMLODIPINE BESYLATE 10 MG: 10 TABLET ORAL at 08:52

## 2022-04-29 RX ADMIN — CETIRIZINE HYDROCHLORIDE 5 MG: 5 TABLET ORAL at 08:52

## 2022-04-29 RX ADMIN — CYANOCOBALAMIN TAB 1000 MCG 1000 MCG: 1000 TAB at 08:52

## 2022-04-29 RX ADMIN — ATORVASTATIN CALCIUM 10 MG: 10 TABLET, FILM COATED ORAL at 08:52

## 2022-04-29 RX ADMIN — HEPARIN SODIUM 5000 UNITS: 5000 INJECTION, SOLUTION INTRAVENOUS; SUBCUTANEOUS at 08:52

## 2022-04-29 RX ADMIN — METOPROLOL TARTRATE 50 MG: 50 TABLET, FILM COATED ORAL at 08:52

## 2022-04-29 RX ADMIN — Medication 10 ML: at 08:53

## 2022-05-01 ENCOUNTER — HOME CARE VISIT (OUTPATIENT)
Dept: HOME HEALTH SERVICES | Facility: CLINIC | Age: 82
End: 2022-05-01

## 2022-05-01 VITALS
RESPIRATION RATE: 18 BRPM | SYSTOLIC BLOOD PRESSURE: 140 MMHG | HEIGHT: 60 IN | BODY MASS INDEX: 21.86 KG/M2 | WEIGHT: 111.33 LBS | DIASTOLIC BLOOD PRESSURE: 64 MMHG

## 2022-05-01 PROCEDURE — G0299 HHS/HOSPICE OF RN EA 15 MIN: HCPCS

## 2022-05-02 ENCOUNTER — TRANSITIONAL CARE MANAGEMENT TELEPHONE ENCOUNTER (OUTPATIENT)
Dept: CALL CENTER | Facility: HOSPITAL | Age: 82
End: 2022-05-02

## 2022-05-02 ENCOUNTER — HOME CARE VISIT (OUTPATIENT)
Dept: HOME HEALTH SERVICES | Facility: CLINIC | Age: 82
End: 2022-05-02

## 2022-05-02 PROCEDURE — G0152 HHCP-SERV OF OT,EA 15 MIN: HCPCS

## 2022-05-02 NOTE — OUTREACH NOTE
Call Center TCM Note    Flowsheet Row Responses   [facility] patient discharged from? [place]   Does the patient have one of the following disease processes/diagnoses(primary or secondary)? Stroke (TIA)   TCM attempt successful? Yes   Call start time 1039   Call end time 1045   Discharge diagnosis Hypertensive urgency   Is patient permission given to speak with other caregiver? No   Meds reviewed with patient/caregiver? Yes  [Dose change on metoprolol tartrate to 50mg twice a day. ]   Does the patient have all medications ordered at discharge? Yes   Is the patient taking all medications as directed (includes completed medication regime)? Yes   Does the patient have a primary care provider?  Yes   Does the patient have an appointment with their PCP within 7 days of discharge? Yes   Comments regarding PCP PCP Dr Valentina Ulrich. Patient had previously scheduled office visit appt on Friday this week 5/6 that she intends to keep.    Has the patient kept scheduled appointments due by today? N/A   What is the Home health agency?  [facility]   Has home health visited the patient within 72 hours of discharge? Call prior to 72 hours  [Patient needs to keep f/u appt for HH ordered to be signed. ]   What DME was ordered? [facility] from [facility]   Psychosocial issues? No   Comments Patient monitoring home blood pressure and recording. Will share readings at f/u appt.    Did the patient receive a copy of their discharge instructions? Yes   Nursing interventions Reviewed instructions with patient   What is the patient's perception of their health status since discharge? Improving   Is the patient/caregiver able to teach back signs and symptoms related to disease process for when to call PCP? Yes   Is the patient/caregiver able to teach back signs and symptoms related to disease process for when to call 911? Yes   Is the patient/caregiver able to teach back the hierarchy of who to call/visit for symptoms/problems? PCP,  Specialist, Home health nurse, Urgent Care, ED, 911 Yes   If the patient is a current smoker, are they able to teach back resources for cessation? Not a smoker   TCM call completed? Yes   Wrap up additional comments Denies further questions or needs today.           Laurie Brink RN    5/2/2022, 10:45 CDT

## 2022-05-02 NOTE — OUTREACH NOTE
Call Center TCM Note    Flowsheet Row Responses   [facility] patient discharged from? [place]   Does the patient have one of the following disease processes/diagnoses(primary or secondary)? Other   TCM attempt successful? No  [Outdated PCP verbal release. ]   Unsuccessful attempts Attempt 1          Laurie Brink RN    5/2/2022, 08:23 CDT

## 2022-05-03 ENCOUNTER — HOME CARE VISIT (OUTPATIENT)
Dept: HOME HEALTH SERVICES | Facility: CLINIC | Age: 82
End: 2022-05-03

## 2022-05-03 VITALS
HEART RATE: 71 BPM | TEMPERATURE: 97.9 F | DIASTOLIC BLOOD PRESSURE: 68 MMHG | OXYGEN SATURATION: 99 % | RESPIRATION RATE: 18 BRPM | SYSTOLIC BLOOD PRESSURE: 142 MMHG

## 2022-05-03 PROCEDURE — G0151 HHCP-SERV OF PT,EA 15 MIN: HCPCS

## 2022-05-03 NOTE — HOME HEALTH
"REASON FOR REFERRAL: Patient admitted to  4/27/2022-4/29/2022 with hypertensive urgency, sinus tachycardia.  She reports she feels generally weak but is getting better.  DIAGNOSIS: Weakness, abnormal gait  SURGICAL PROCEDURE: N/A  PERTINENT MEDICAL HISTORY:  HTN, CKD, anemia, DJD, HLD, hx breast CA, OP, PVD, RLS, vitamin D deficiency  PRIOR LEVEL OF FUNCTION: Ambulate in home and short distances in community without AD  PATIENT GOAL FOR THIS EPISODE OF CARE: \"Walk without a cane\"  HOME SOCIAL ENVIRONMENT: Patient lives alone in single story home with several steps to enter.  Has to descend 2 steps to access laundry room.    **No BP in right arm**"

## 2022-05-04 ENCOUNTER — HOME CARE VISIT (OUTPATIENT)
Dept: HOME HEALTH SERVICES | Facility: CLINIC | Age: 82
End: 2022-05-04

## 2022-05-04 ENCOUNTER — TELEPHONE (OUTPATIENT)
Dept: FAMILY MEDICINE CLINIC | Facility: CLINIC | Age: 82
End: 2022-05-04

## 2022-05-04 ENCOUNTER — LAB REQUISITION (OUTPATIENT)
Dept: LAB | Facility: HOSPITAL | Age: 82
End: 2022-05-04

## 2022-05-04 VITALS
DIASTOLIC BLOOD PRESSURE: 74 MMHG | TEMPERATURE: 97.5 F | SYSTOLIC BLOOD PRESSURE: 142 MMHG | HEART RATE: 76 BPM | RESPIRATION RATE: 18 BRPM

## 2022-05-04 DIAGNOSIS — N39.0 URINARY TRACT INFECTION, SITE NOT SPECIFIED: ICD-10-CM

## 2022-05-04 DIAGNOSIS — R30.0 DYSURIA: Primary | ICD-10-CM

## 2022-05-04 DIAGNOSIS — I12.9 HYPERTENSIVE CHRONIC KIDNEY DISEASE WITH STAGE 1 THROUGH STAGE 4 CHRONIC KIDNEY DISEASE, OR UNSPECIFIED CHRONIC KIDNEY DISEASE: ICD-10-CM

## 2022-05-04 LAB
BILIRUB UR QL STRIP: NEGATIVE
CLARITY UR: CLEAR
COLOR UR: YELLOW
GLUCOSE UR STRIP-MCNC: NEGATIVE MG/DL
HGB UR QL STRIP.AUTO: NEGATIVE
KETONES UR QL STRIP: NEGATIVE
LEUKOCYTE ESTERASE UR QL STRIP.AUTO: ABNORMAL
NITRITE UR QL STRIP: NEGATIVE
PH UR STRIP.AUTO: 5.5 [PH] (ref 5–9)
PROT UR QL STRIP: NEGATIVE
SP GR UR STRIP: 1.01 (ref 1–1.03)
UROBILINOGEN UR QL STRIP: ABNORMAL

## 2022-05-04 PROCEDURE — 87086 URINE CULTURE/COLONY COUNT: CPT | Performed by: FAMILY MEDICINE

## 2022-05-04 PROCEDURE — 87186 SC STD MICRODIL/AGAR DIL: CPT | Performed by: FAMILY MEDICINE

## 2022-05-04 PROCEDURE — 81003 URINALYSIS AUTO W/O SCOPE: CPT | Performed by: FAMILY MEDICINE

## 2022-05-04 PROCEDURE — G0299 HHS/HOSPICE OF RN EA 15 MIN: HCPCS

## 2022-05-04 PROCEDURE — 87077 CULTURE AEROBIC IDENTIFY: CPT | Performed by: FAMILY MEDICINE

## 2022-05-04 NOTE — CASE COMMUNICATION
THE FOLLOWING INSTRUCTIONS WERE REVIEWED UPON DISCHARGE:    Keep your appointment with Dr. Ulrich  on 4/6/22    Call your doctor if you develop a new or worsening symptom, high blood pressure,  chest pain, difficulty breathing.     Continue to take the medications prescribed by your doctor. A medication list is attached. Be sure to keep them informed of all medications you take including over the counter herbal, vitamins/minerals and t he ones you take only when needed.    Follow the renal diet as ordered by your doctor.     Continue with home program PT visit .      Instructions given to Patient    Instructions provided per Visit

## 2022-05-04 NOTE — TELEPHONE ENCOUNTER
Jeannette with [facility] home care called needing to speak to Dr. Ulrich about Mrs. Leon possibly having a UTI and was wanting to know if she could get a urine sample     Please call @ [phone]

## 2022-05-05 ENCOUNTER — TELEPHONE (OUTPATIENT)
Dept: FAMILY MEDICINE CLINIC | Facility: CLINIC | Age: 82
End: 2022-05-05

## 2022-05-05 DIAGNOSIS — R30.0 DYSURIA: Primary | ICD-10-CM

## 2022-05-05 RX ORDER — LEVOFLOXACIN 250 MG/1
250 TABLET ORAL
Qty: 4 TABLET | Refills: 0 | Status: SHIPPED | OUTPATIENT
Start: 2022-05-05 | End: 2022-05-12

## 2022-05-05 NOTE — TELEPHONE ENCOUNTER
Spoke with patient.  Urine culture shows UTI, treat with Levaquin - renally dosed.  Will call patient once sensitivities return.  Kala Ulrich

## 2022-05-06 ENCOUNTER — OFFICE VISIT (OUTPATIENT)
Dept: FAMILY MEDICINE CLINIC | Facility: CLINIC | Age: 82
End: 2022-05-06

## 2022-05-06 VITALS
SYSTOLIC BLOOD PRESSURE: 140 MMHG | DIASTOLIC BLOOD PRESSURE: 68 MMHG | TEMPERATURE: 97.9 F | HEART RATE: 70 BPM | RESPIRATION RATE: 18 BRPM | OXYGEN SATURATION: 99 %

## 2022-05-06 VITALS
HEART RATE: 77 BPM | BODY MASS INDEX: 21.99 KG/M2 | DIASTOLIC BLOOD PRESSURE: 64 MMHG | SYSTOLIC BLOOD PRESSURE: 121 MMHG | HEIGHT: 60 IN | WEIGHT: 112 LBS | OXYGEN SATURATION: 98 %

## 2022-05-06 DIAGNOSIS — I15.0 RENOVASCULAR HYPERTENSION: Primary | ICD-10-CM

## 2022-05-06 DIAGNOSIS — N30.00 ACUTE CYSTITIS WITHOUT HEMATURIA: ICD-10-CM

## 2022-05-06 LAB — BACTERIA SPEC AEROBE CULT: ABNORMAL

## 2022-05-06 PROCEDURE — 99495 TRANSJ CARE MGMT MOD F2F 14D: CPT | Performed by: FAMILY MEDICINE

## 2022-05-06 PROCEDURE — G0180 MD CERTIFICATION HHA PATIENT: HCPCS | Performed by: FAMILY MEDICINE

## 2022-05-06 NOTE — HOME HEALTH
"REASON FOR REFERRAL: Patient admitted to  4/27/2022-4/29/2022 with hypertensive urgency, sinus tachycardia. She reports she feels generally weak but is getting better. DIAGNOSIS: Weakness.  MEDICAL HISTORY: HTN, CKD, anemia, DJD, HLD, hx breast CA, OP, PVD, RLS, vitamin D deficiency PRIOR LEVEL OF FUNCTION: Independent with ADLs. PATIENT GOAL FOR THIS EPISODE OF CARE: \"Walk without a cane\" HOME SOCIAL ENVIRONMENT: Patient lives alone in single story home with several steps to enter. Has to descend 2 steps to access laundry room. **No BP in right arm**  ROM WFL and MMT 4/5  Eval only. Defer to PT."

## 2022-05-06 NOTE — PROGRESS NOTES
Transitional Care Follow Up Visit  Subjective     Ilda Leon is a 82 y.o. female who presents for a transitional care management visit.    Within 48 business hours after discharge our office contacted her via telephone to coordinate her care and needs.      I reviewed and discussed the details of that call along with the discharge summary, hospital problems, inpatient lab results, inpatient diagnostic studies, and consultation reports with Ilda.     Current outpatient and discharge medications have been reconciled for the patient.  Reviewed by: Valentina Ulrich MD      Date of TCM Phone Call 4/29/2022   [facility]   Date of Admission 4/27/2022   Date of Discharge 4/29/2022   Discharge Disposition Home-Health Care [place]     Risk for Readmission (LACE) Score: 8 (4/29/2022  5:01 AM)      History of Present Illness   Course During Hospital Stay:  Patient admitted for hypertensive urgency.   Metoprolol increased, she was continued on amlodipine.  Blood pressure improved prior to discharge. She was also discharged with home health for deconditioning.     The following portions of the patient's history were reviewed and updated as appropriate: allergies, current medications, past medical history and problem list.    Objective      Vitals:    05/06/22 1027   BP: 121/64   Pulse: 77   SpO2: 98%       Physical Exam  Vitals reviewed.   Constitutional:       General: She is not in acute distress.     Appearance: She is well-developed.   Cardiovascular:      Rate and Rhythm: Normal rate and regular rhythm.      Heart sounds: Normal heart sounds. No murmur heard.  Pulmonary:      Effort: Pulmonary effort is normal. No respiratory distress.      Breath sounds: Normal breath sounds. No wheezing or rales.   Skin:     General: Skin is warm and dry.   Neurological:      Mental Status: She is alert and oriented to person, place, and time.       Brief Urine Lab Results  (Last result in the past 365 days)      Color    Clarity   Blood   Leuk Est   Nitrite   Protein   CREAT   Urine HCG        05/04/22 1447 Yellow   Clear   Negative   Small (1+)   Negative   Negative                 Assessment/Plan   Diagnoses and all orders for this visit:    1. Renovascular hypertension (Primary)    2. Acute cystitis without hematuria      Patient seen for follow up  Hypertension controlled now  Continue current medication  Urinalysis checked a couple of days ago, home health called for concerns of UTI symptoms  Patient started on Levaquin  Urine culture showing E.coli susceptible to this medication  Patient will complete full course of antibiotic        Return in about 4 weeks (around 6/3/2022).        This document has been electronically signed by Valentina Ulrich MD

## 2022-05-10 ENCOUNTER — READMISSION MANAGEMENT (OUTPATIENT)
Dept: CALL CENTER | Facility: HOSPITAL | Age: 82
End: 2022-05-10

## 2022-05-10 ENCOUNTER — HOME CARE VISIT (OUTPATIENT)
Dept: HOME HEALTH SERVICES | Facility: CLINIC | Age: 82
End: 2022-05-10

## 2022-05-10 VITALS
TEMPERATURE: 99.2 F | RESPIRATION RATE: 18 BRPM | SYSTOLIC BLOOD PRESSURE: 146 MMHG | DIASTOLIC BLOOD PRESSURE: 78 MMHG | HEART RATE: 76 BPM | OXYGEN SATURATION: 97 %

## 2022-05-10 PROCEDURE — G0157 HHC PT ASSISTANT EA 15: HCPCS

## 2022-05-11 NOTE — HOME HEALTH
pt up amb. in home w/ SPC and reports increasing activities in/out home as able; pt reports frequent amb. in home w/o AAD but SPC use outside home

## 2022-05-13 ENCOUNTER — READMISSION MANAGEMENT (OUTPATIENT)
Dept: CALL CENTER | Facility: HOSPITAL | Age: 82
End: 2022-05-13

## 2022-05-13 NOTE — OUTREACH NOTE
Medical Week 2 Survey    Flowsheet Row Responses   [facility] patient discharged from? [place]   Does the patient have one of the following disease processes/diagnoses(primary or secondary)? Stroke (TIA)   Week 2 attempt successful? Yes   Call start time 1449   Discharge diagnosis Hypertensive urgency   Call end time 1453   Meds reviewed with patient/caregiver? Yes   Is the patient taking all medications as directed (includes completed medication regime)? Yes   Has the patient kept scheduled appointments due by today? Yes   What is the Home health agency?  [facility]   Has home health visited the patient within 72 hours of discharge? Yes   Has all DME been delivered? Yes   What is the patient's perception of their health status since discharge? Improving   Week 2 Call Completed? Yes   Wrap up additional comments Bp 129/62. Has been under much better control.  PT at home.  Good progress.            CALE BUITRAGO - Registered Nurse

## 2022-05-17 ENCOUNTER — HOME CARE VISIT (OUTPATIENT)
Dept: HOME HEALTH SERVICES | Facility: CLINIC | Age: 82
End: 2022-05-17

## 2022-05-17 VITALS
OXYGEN SATURATION: 98 % | TEMPERATURE: 98.1 F | HEART RATE: 74 BPM | SYSTOLIC BLOOD PRESSURE: 122 MMHG | RESPIRATION RATE: 18 BRPM | DIASTOLIC BLOOD PRESSURE: 78 MMHG

## 2022-05-17 PROCEDURE — G0157 HHC PT ASSISTANT EA 15: HCPCS

## 2022-05-18 NOTE — HOME HEALTH
pt up amb. in home w/o AAD, and reports was able to go to [place] this weekend w/ friends assisting if/when needed; pt reports still usses SPC during activities outside home

## 2022-05-25 ENCOUNTER — HOME CARE VISIT (OUTPATIENT)
Dept: HOME HEALTH SERVICES | Facility: CLINIC | Age: 82
End: 2022-05-25

## 2022-05-25 VITALS
RESPIRATION RATE: 18 BRPM | DIASTOLIC BLOOD PRESSURE: 65 MMHG | SYSTOLIC BLOOD PRESSURE: 142 MMHG | HEART RATE: 62 BPM | TEMPERATURE: 98 F | OXYGEN SATURATION: 98 %

## 2022-05-25 PROCEDURE — G0151 HHCP-SERV OF PT,EA 15 MIN: HCPCS

## 2022-05-26 NOTE — HOME HEALTH
"Patient reports she still gets tired easy but she can take care of herself and her house now.  Her R hip still bothers her from her arthritis.  She reports she does her exercises some but just when she thinks of them.  Patient states, \"I don't have any trouble getting around\"."

## 2022-06-03 RX ORDER — AMLODIPINE BESYLATE 10 MG/1
10 TABLET ORAL DAILY
Qty: 90 TABLET | Refills: 1 | Status: SHIPPED | OUTPATIENT
Start: 2022-06-03

## 2022-06-03 RX ORDER — ATORVASTATIN CALCIUM 10 MG/1
10 TABLET, FILM COATED ORAL DAILY
Qty: 90 TABLET | Refills: 0 | Status: SHIPPED | OUTPATIENT
Start: 2022-06-03 | End: 2022-08-30

## 2022-06-03 NOTE — TELEPHONE ENCOUNTER
Incoming Refill Request      Medication requested (name and dose):   amLODIPine (NORVASC) 10 MG tablet  atorvastatin (LIPITOR) 10 MG tablet  Pharmacy where request should be sent: [facility] in [place], [place]     Additional details provided by patient: changed pharmacy so needs new scripts for both     Best call back number: [phone]    Does the patient have less than a 3 day supply:  [x] Yes  [] No    Gloria eHnsley Rep  06/03/22, 09:44 CDT

## 2022-06-09 ENCOUNTER — LAB (OUTPATIENT)
Dept: LAB | Facility: HOSPITAL | Age: 82
End: 2022-06-09

## 2022-06-09 ENCOUNTER — TELEPHONE (OUTPATIENT)
Dept: FAMILY MEDICINE CLINIC | Facility: CLINIC | Age: 82
End: 2022-06-09

## 2022-06-09 ENCOUNTER — OFFICE VISIT (OUTPATIENT)
Dept: FAMILY MEDICINE CLINIC | Facility: CLINIC | Age: 82
End: 2022-06-09

## 2022-06-09 VITALS
OXYGEN SATURATION: 99 % | HEART RATE: 73 BPM | WEIGHT: 110 LBS | BODY MASS INDEX: 21.6 KG/M2 | SYSTOLIC BLOOD PRESSURE: 120 MMHG | HEIGHT: 60 IN | DIASTOLIC BLOOD PRESSURE: 74 MMHG

## 2022-06-09 DIAGNOSIS — I15.0 RENOVASCULAR HYPERTENSION: Primary | ICD-10-CM

## 2022-06-09 DIAGNOSIS — I15.0 RENOVASCULAR HYPERTENSION: ICD-10-CM

## 2022-06-09 DIAGNOSIS — N18.4 STAGE 4 CHRONIC KIDNEY DISEASE: ICD-10-CM

## 2022-06-09 LAB
BASOPHILS # BLD AUTO: 0.06 10*3/MM3 (ref 0–0.2)
BASOPHILS NFR BLD AUTO: 0.7 % (ref 0–1.5)
DEPRECATED RDW RBC AUTO: 42.9 FL (ref 37–54)
EOSINOPHIL # BLD AUTO: 0.3 10*3/MM3 (ref 0–0.4)
EOSINOPHIL NFR BLD AUTO: 3.3 % (ref 0.3–6.2)
ERYTHROCYTE [DISTWIDTH] IN BLOOD BY AUTOMATED COUNT: 12.8 % (ref 12.3–15.4)
HCT VFR BLD AUTO: 34.8 % (ref 34–46.6)
HGB BLD-MCNC: 11.6 G/DL (ref 12–15.9)
IMM GRANULOCYTES # BLD AUTO: 0.03 10*3/MM3 (ref 0–0.05)
IMM GRANULOCYTES NFR BLD AUTO: 0.3 % (ref 0–0.5)
LYMPHOCYTES # BLD AUTO: 2 10*3/MM3 (ref 0.7–3.1)
LYMPHOCYTES NFR BLD AUTO: 22.2 % (ref 19.6–45.3)
MCH RBC QN AUTO: 31.4 PG (ref 26.6–33)
MCHC RBC AUTO-ENTMCNC: 33.3 G/DL (ref 31.5–35.7)
MCV RBC AUTO: 94.1 FL (ref 79–97)
MONOCYTES # BLD AUTO: 0.7 10*3/MM3 (ref 0.1–0.9)
MONOCYTES NFR BLD AUTO: 7.8 % (ref 5–12)
NEUTROPHILS NFR BLD AUTO: 5.93 10*3/MM3 (ref 1.7–7)
NEUTROPHILS NFR BLD AUTO: 65.7 % (ref 42.7–76)
NRBC BLD AUTO-RTO: 0 /100 WBC (ref 0–0.2)
PLATELET # BLD AUTO: 272 10*3/MM3 (ref 140–450)
PMV BLD AUTO: 11.3 FL (ref 6–12)
RBC # BLD AUTO: 3.7 10*6/MM3 (ref 3.77–5.28)
WBC NRBC COR # BLD: 9.02 10*3/MM3 (ref 3.4–10.8)

## 2022-06-09 PROCEDURE — 36415 COLL VENOUS BLD VENIPUNCTURE: CPT

## 2022-06-09 PROCEDURE — 99213 OFFICE O/P EST LOW 20 MIN: CPT | Performed by: FAMILY MEDICINE

## 2022-06-09 PROCEDURE — 85025 COMPLETE CBC W/AUTO DIFF WBC: CPT

## 2022-06-09 PROCEDURE — 80053 COMPREHEN METABOLIC PANEL: CPT

## 2022-06-09 RX ORDER — AMLODIPINE BESYLATE 5 MG/1
TABLET ORAL
COMMUNITY
Start: 2022-06-03 | End: 2022-06-09

## 2022-06-09 RX ORDER — LACTOSE-REDUCED FOOD
LIQUID (ML) ORAL
COMMUNITY

## 2022-06-10 LAB
ALBUMIN SERPL-MCNC: 4.3 G/DL (ref 3.5–5.2)
ALBUMIN/GLOB SERPL: 1.6 G/DL
ALP SERPL-CCNC: 95 U/L (ref 39–117)
ALT SERPL W P-5'-P-CCNC: 12 U/L (ref 1–33)
ANION GAP SERPL CALCULATED.3IONS-SCNC: 12.4 MMOL/L (ref 5–15)
AST SERPL-CCNC: 20 U/L (ref 1–32)
BILIRUB SERPL-MCNC: 0.3 MG/DL (ref 0–1.2)
BUN SERPL-MCNC: 50 MG/DL (ref 8–23)
BUN/CREAT SERPL: 19.6 (ref 7–25)
CALCIUM SPEC-SCNC: 9.2 MG/DL (ref 8.6–10.5)
CHLORIDE SERPL-SCNC: 102 MMOL/L (ref 98–107)
CO2 SERPL-SCNC: 23.6 MMOL/L (ref 22–29)
CREAT SERPL-MCNC: 2.55 MG/DL (ref 0.57–1)
EGFRCR SERPLBLD CKD-EPI 2021: 18.2 ML/MIN/1.73
GLOBULIN UR ELPH-MCNC: 2.7 GM/DL
GLUCOSE SERPL-MCNC: 101 MG/DL (ref 65–99)
POTASSIUM SERPL-SCNC: 4.3 MMOL/L (ref 3.5–5.2)
PROT SERPL-MCNC: 7 G/DL (ref 6–8.5)
SODIUM SERPL-SCNC: 138 MMOL/L (ref 136–145)

## 2022-06-14 ENCOUNTER — TELEPHONE (OUTPATIENT)
Dept: FAMILY MEDICINE CLINIC | Facility: CLINIC | Age: 82
End: 2022-06-14

## 2022-06-14 NOTE — PROGRESS NOTES
Per Dr. Ulrich, Ms. Leon has been called with recent lab results & recommendations.  Continue current medications and follow-up as planned or sooner if any problems.   These labs have been faxed.   She states she has an appt in July for follow-up

## 2022-06-14 NOTE — TELEPHONE ENCOUNTER
Per Dr. Ulrich, Ms. Leon has been called with recent lab results & recommendations.  Continue current medications and follow-up as planned or sooner if any problems.   These labs have been faxed.   She states she has an appt in July for follow-up      ----- Message from Valentina Ulrich MD sent at 6/10/2022  6:02 AM CDT -----  Kidney function is still decreased.  Please ask that patient increase her water intake some.  Please fax results to Dr. Velazquez's office and make sure patient has upcoming appointment with his office in the next 1-2 months for follow up after recent change in kidney function.  Thanks, SEBASTIAN Ulrich

## 2022-07-25 RX ORDER — CLOPIDOGREL BISULFATE 75 MG/1
TABLET ORAL
Qty: 90 TABLET | Refills: 0 | Status: SHIPPED | OUTPATIENT
Start: 2022-07-25 | End: 2022-10-18

## 2022-08-08 ENCOUNTER — OFFICE VISIT (OUTPATIENT)
Dept: CARDIAC SURGERY | Facility: CLINIC | Age: 82
End: 2022-08-08

## 2022-08-08 VITALS
SYSTOLIC BLOOD PRESSURE: 168 MMHG | HEIGHT: 60 IN | TEMPERATURE: 98.2 F | WEIGHT: 108.2 LBS | HEART RATE: 63 BPM | OXYGEN SATURATION: 98 % | BODY MASS INDEX: 21.24 KG/M2 | DIASTOLIC BLOOD PRESSURE: 78 MMHG

## 2022-08-08 DIAGNOSIS — I73.9 PERIPHERAL VASCULAR DISEASE: ICD-10-CM

## 2022-08-08 DIAGNOSIS — I10 PRIMARY HYPERTENSION: ICD-10-CM

## 2022-08-08 DIAGNOSIS — I70.1 RENAL ARTERY STENOSIS: Primary | ICD-10-CM

## 2022-08-08 DIAGNOSIS — C50.919 MALIGNANT NEOPLASM OF FEMALE BREAST, UNSPECIFIED ESTROGEN RECEPTOR STATUS, UNSPECIFIED LATERALITY, UNSPECIFIED SITE OF BREAST: ICD-10-CM

## 2022-08-08 DIAGNOSIS — I65.23 CAROTID STENOSIS, ASYMPTOMATIC, BILATERAL: ICD-10-CM

## 2022-08-08 DIAGNOSIS — N18.4 CKD (CHRONIC KIDNEY DISEASE) STAGE 4, GFR 15-29 ML/MIN: ICD-10-CM

## 2022-08-08 PROCEDURE — 99214 OFFICE O/P EST MOD 30 MIN: CPT | Performed by: THORACIC SURGERY (CARDIOTHORACIC VASCULAR SURGERY)

## 2022-08-08 NOTE — PROGRESS NOTES
8/8/2022    Ilda Leon  1940    Chief Complaint:    Chief Complaint   Patient presents with   • Carotid Artery Disease   • Renal Artery Stenosis       HPI:      PCP:  Valentina Ulrich MD  Nephrology:  Dr Velazquez     82y.o. female with HTN(stable, increased risk stroke, rupture), Hyperlipidemia(stable, increased risk cardiovascular events) and Chronic Kidney Disease(stable, increased risk renal failure) , renal artery stenosis(stable, increase risk cardiovascular events), carotid stenosis(stable, increase risk stroke).  never smoked.  nearing need for dialysis. Asymptomatic carotid stenosis.  Atrophic LEFT kidney..  No other associated signs, symptoms or modifying factors.     7/2020 Carotid Duplex:  RUIZ 0-49% (100/25cm/s, ratio 1.8), LICA 50-69% (165/33cm/s, ratio 2.8) antegrade verts.  8/2021 Carotid Duplex:  RUIZ 0-49% (81/23cm/s, ratio 1.3), LICA 50-69% (153/23cm/s, ratio 2.5) antegrade verts.  8/2022 Carotid Duplex:  RUIZ 0-49% (83/20cm/s, ratio 1.3), LICA 50-69% (150/23cm/s, ratio 2.1) antegrade verts.     5/2006 RIGHT renal artery stent  12/2016 Renal Artery Duplex:  RIGHT <60% (90/20cm/s, RAR .70), LEFT atrophic.  7/2018 Renal Artery Duplex:  RIGHT <60% (130/30cm/s, RAR .90), LEFT atrophic.  7/2019 Renal Artery Duplex:  RIGHT <60% (146/35cm/s, RAR 1.3), LEFT atrophic.  7/2020 Renal Artery Duplex:  RIGHT <60% (124/27cm/s, RAR .85) patent stent, LEFT atrophic.  8/2021 Renal Artery Duplex:  RIGHT <60% (92/22cm/s, RAR .68) patent stent, LEFT atrophic.  8/2022 Renal Artery Duplex:  RIGHT <60% (104/27cm/s, RAR 1.1) patent stent, LEFT atrophic.    The following portions of the patient's history were reviewed and updated as appropriate: allergies, current medications, past family history, past medical history, past social history, past surgical history and problem list.  Recent images independently reviewed.  Available laboratory values reviewed.    PMH:  Past Medical History:   Diagnosis Date   • Aching  leg syndrome    • Acute bronchitis    • Acute serous otitis media    • Acute sinusitis    • Adjustment disorder with anxious mood    • Adverse reaction to drug    • Allergic rhinitis    • Anemia    • Artificial lens present     Artificial lens in position.   • Atopic conjunctivitis    • Breast cyst    • Chronic kidney disease, stage 3 (HCC)     in the right left non-functional   • Cortical senile cataract    • Degenerative joint disease involving multiple joints    • Disorder of muscle     statin-induced      • Dyslipidemia    • Family history of rheumatic fever 1947   • Hearing loss of left ear    • History of blood clots     in left renal artery resulting in loss of left kidney   • History of mammography, screening 08/06/2015   • History of radiation therapy 1991   • History of shingles 2011   • Hx of radiation therapy    • Hyperglycemia    • Hyperlipidemia    • Hypertension    • Malignant neoplasm of female breast (HCC)     history of with mastectomy      • Nausea    • Nausea and vomiting    • Need for prophylactic vaccination against Streptococcus pneumoniae (pneumococcus)    • Need for prophylactic vaccination or inoculation against diphtheria-tetanus-pertussis, combined [DTP] [DTaP]    • Non-alcoholic fatty liver disease    • Nuclear cataract    • Osteoporosis    • Pain in back, lumbar region    • Peripheral vascular disease (HCC)    • Renal artery stenosis (HCC)    • Renovascular hypertension    • Restless legs    • Screening for osteoporosis    • Stricture of esophagus    • Trigeminal neuralgia    • Upper respiratory infection    • Vitamin D deficiency      Past Surgical History:   Procedure Laterality Date   • BREAST BIOPSY     • BREAST LUMPECTOMY Right 1991   • CATARACT EXTRACTION WITH INTRAOCULAR LENS IMPLANT Right 2008   • CHOLECYSTECTOMY  03/04/2008    Laparoscopic cholecystectomy with operative cholangiogram. Acute cholecystitis   • COLONOSCOPY  2006   • INJECTION OF MEDICATION  10/03/2011    Kenalog  (1)    • MAMMO BILATERAL  02/28/2015    Screening   • RENAL ARTERY STENT Right 05/18/2006    5x15mm balloon expandable stent x2      Family History   Problem Relation Age of Onset   • Heart disease Other    • Hypertension Other    • Diabetes Mother    • Heart disease Mother    • Hyperlipidemia Mother    • Hypertension Mother    • Stroke Father    • Cancer Brother         prostate   • Cancer Sister 60        lung   • Breast cancer Sister      Social History     Tobacco Use   • Smoking status: Never Smoker   • Smokeless tobacco: Never Used   Vaping Use   • Vaping Use: Never used   Substance Use Topics   • Alcohol use: No   • Drug use: No       ALLERGIES:  Allergies   Allergen Reactions   • Carbamazepine Unknown - High Severity   • Other Unknown - High Severity     Statins-Hmg-Coa Reductase Inhibitors;     • Sulfa Antibiotics Nausea And Vomiting   • Zithromax [Azithromycin] Nausea And Vomiting   • Allegra [Fexofenadine] Unknown - Low Severity     Unknown Reaction   • Trileptal [Oxcarbazepine] Unknown - High Severity         MEDICATIONS:    Current Outpatient Medications:   •  acetaminophen (TYLENOL) 325 MG tablet, Take 650 mg by mouth Every 6 (Six) Hours As Needed for Mild Pain ., Disp: , Rfl:   •  amLODIPine (NORVASC) 10 MG tablet, Take 1 tablet by mouth Daily., Disp: 90 tablet, Rfl: 1  •  atorvastatin (LIPITOR) 10 MG tablet, Take 1 tablet by mouth Daily., Disp: 90 tablet, Rfl: 0  •  clopidogrel (PLAVIX) 75 MG tablet, TAKE 1 TABLET BY MOUTH DAILY, Disp: 90 tablet, Rfl: 0  •  furosemide (LASIX) 20 MG tablet, Take 0.5 tablets by mouth Daily., Disp: , Rfl:   •  loratadine (CLARITIN) 10 MG tablet, TAKE 1 TABLET BY MOUTH DAILY., Disp: 10 tablet, Rfl: 2  •  metoprolol tartrate (LOPRESSOR) 50 MG tablet, Take 1 tablet by mouth 2 (Two) Times a Day., Disp: 30 tablet, Rfl: 2  •  Nutritional Supplements (Suplena/Carb Steady) liquid, Take  by mouth., Disp: , Rfl:   •  vitamin B-12 (CYANOCOBALAMIN) 1000 MCG tablet, Take 1,000 mcg  "by mouth Daily., Disp: , Rfl:     Review of Systems   Review of Systems   Constitutional: Positive for malaise/fatigue. Negative for weight loss.   Cardiovascular: Negative for chest pain, claudication and dyspnea on exertion.   Respiratory: Negative for cough and shortness of breath.    Skin: Negative for color change and poor wound healing.   Neurological: Positive for loss of balance. Negative for dizziness, numbness and weakness.       Physical Exam   Vitals:    08/08/22 0929   BP: 168/78   BP Location: Left arm   Pulse: 63   Temp: 98.2 °F (36.8 °C)   TempSrc: Infrared   SpO2: 98%   Weight: 49.1 kg (108 lb 3.2 oz)   Height: 152.4 cm (60\")     Body surface area is 1.44 meters squared.  Body mass index is 21.13 kg/m².  Physical Exam  Constitutional:       General: She is not in acute distress.     Appearance: She is not ill-appearing.   HENT:      Right Ear: Hearing normal.      Left Ear: Hearing normal.      Nose: No nasal deformity.      Mouth/Throat:      Dentition: Normal dentition. Does not have dentures.   Cardiovascular:      Rate and Rhythm: Normal rate and regular rhythm.      Pulses:           Carotid pulses are 2+ on the right side and 2+ on the left side.       Radial pulses are 2+ on the right side and 2+ on the left side.        Dorsalis pedis pulses are 2+ on the right side and 2+ on the left side.        Posterior tibial pulses are 1+ on the right side and 1+ on the left side.      Heart sounds: No murmur heard.  Pulmonary:      Effort: Pulmonary effort is normal.      Breath sounds: Normal breath sounds.   Abdominal:      General: There is no distension.      Palpations: Abdomen is soft. There is no mass.      Tenderness: There is no abdominal tenderness.   Musculoskeletal:         General: No deformity.      Comments: Gait normal.    Skin:     General: Skin is warm and dry.      Coloration: Skin is not pale.      Findings: No erythema.      Comments: No venous staining   Neurological:      Mental " Status: She is alert and oriented to person, place, and time.   Psychiatric:         Speech: Speech normal.         Behavior: Behavior is cooperative.         Thought Content: Thought content normal.         Judgment: Judgment normal.         BUN   Date Value Ref Range Status   06/09/2022 50 (H) 8 - 23 mg/dL Final     Creatinine   Date Value Ref Range Status   06/09/2022 2.55 (H) 0.57 - 1.00 mg/dL Final     eGFR Non  Amer   Date Value Ref Range Status   12/02/2019 38 (L) >60 mL/min/1.73 Final       ASSESSMENT:  Diagnoses and all orders for this visit:    1. Renal artery stenosis (HCC) (Primary)  -     Duplex Renal Artery - Bilateral Complete CAR; Future    2. CKD (chronic kidney disease) stage 4, GFR 15-29 ml/min (HCC)    3. Malignant neoplasm of female breast, unspecified estrogen receptor status, unspecified laterality, unspecified site of breast (HCC)    4. Peripheral vascular disease (HCC)    5. Primary hypertension    6. Carotid stenosis, asymptomatic, bilateral  -     Duplex Carotid Ultrasound CAR; Future    PLAN:  Detailed discussion with Ilda Leon regarding situation and options.  Stable carotid stenosis, patent renal artery stent.  Multiple risk factors with severe comorbidities.  No intervention indicated at this time.  Will follow with interval imaging.  Risks, benefits discussed.  Understands and wishes to proceed with plan.    Return in 1 year with carotid duplex, renal artery duplex    Return after above studies complete  Recommended regular physical activity, progressive walking program.  Continue current medications as directed.  Advance Care Planning   ACP discussion was declined by the patient. Patient has an advance directive in EMR which is still valid.      Thank you for the opportunity to participate in this patient's care.    Copy to primary care provider.

## 2022-08-30 RX ORDER — ATORVASTATIN CALCIUM 10 MG/1
10 TABLET, FILM COATED ORAL DAILY
Qty: 90 TABLET | Refills: 0 | Status: SHIPPED | OUTPATIENT
Start: 2022-08-30 | End: 2022-11-28

## 2022-10-18 ENCOUNTER — OFFICE VISIT (OUTPATIENT)
Dept: FAMILY MEDICINE CLINIC | Facility: CLINIC | Age: 82
End: 2022-10-18

## 2022-10-18 VITALS
SYSTOLIC BLOOD PRESSURE: 141 MMHG | HEIGHT: 60 IN | OXYGEN SATURATION: 98 % | HEART RATE: 71 BPM | DIASTOLIC BLOOD PRESSURE: 70 MMHG | WEIGHT: 110 LBS | BODY MASS INDEX: 21.6 KG/M2

## 2022-10-18 DIAGNOSIS — I15.0 RENOVASCULAR HYPERTENSION: Primary | ICD-10-CM

## 2022-10-18 DIAGNOSIS — N18.4 STAGE 4 CHRONIC KIDNEY DISEASE: ICD-10-CM

## 2022-10-18 DIAGNOSIS — M81.0 OSTEOPOROSIS WITHOUT CURRENT PATHOLOGICAL FRACTURE, UNSPECIFIED OSTEOPOROSIS TYPE: ICD-10-CM

## 2022-10-18 PROCEDURE — 99214 OFFICE O/P EST MOD 30 MIN: CPT | Performed by: FAMILY MEDICINE

## 2022-10-18 RX ORDER — CLOPIDOGREL BISULFATE 75 MG/1
TABLET ORAL
Qty: 90 TABLET | Refills: 0 | Status: SHIPPED | OUTPATIENT
Start: 2022-10-18 | End: 2023-01-17

## 2022-10-18 NOTE — PROGRESS NOTES
"Chief Complaint  Hypertension    Subjective    History of Present Illness {CC  Problem List  Visit  Diagnosis   Encounters  Notes  Medications  Labs  Result Review Imaging  Media :23}     Ilda Leon presents to [facility] [place] for     Chief Complaint   Patient presents with   • Hypertension      Patient seen today for follow-up.  She reports that she has been doing okay since last visit.  Blood pressure has been doing well.  She is currently taking metoprolol 50 mg twice daily and amlodipine 10 mg daily.  Did have renovascular studies, which showed patent stent.  Patient continues to follow with nephrology for monitoring of chronic kidney disease, which has been worsening.  Patient has had increased anxiety lately due to her 's health.  He is currently in a nursing home and per patient on \"death watch\" and his organs are shutting down.  She was able to visit him a couple of weeks ago.      Current Outpatient Medications:   •  acetaminophen (TYLENOL) 325 MG tablet, Take 650 mg by mouth Every 6 (Six) Hours As Needed for Mild Pain ., Disp: , Rfl:   •  amLODIPine (NORVASC) 10 MG tablet, Take 1 tablet by mouth Daily., Disp: 90 tablet, Rfl: 1  •  atorvastatin (LIPITOR) 10 MG tablet, TAKE 1 TABLET BY MOUTH DAILY, Disp: 90 tablet, Rfl: 0  •  clopidogrel (PLAVIX) 75 MG tablet, TAKE 1 TABLET BY MOUTH DAILY, Disp: 90 tablet, Rfl: 0  •  furosemide (LASIX) 20 MG tablet, Take 0.5 tablets by mouth Daily., Disp: , Rfl:   •  metoprolol tartrate (LOPRESSOR) 50 MG tablet, Take 1 tablet by mouth 2 (Two) Times a Day., Disp: 30 tablet, Rfl: 2  •  Nutritional Supplements (Suplena/Carb Steady) liquid, Take  by mouth., Disp: , Rfl:   •  vitamin B-12 (CYANOCOBALAMIN) 1000 MCG tablet, Take 1,000 mcg by mouth Daily., Disp: , Rfl:   •  loratadine (CLARITIN) 10 MG tablet, TAKE 1 TABLET BY MOUTH DAILY., Disp: 10 tablet, Rfl: 2     Objective       Vital Signs:   /70   " "Pulse 71   Ht 152.4 cm (60\")   Wt 49.9 kg (110 lb)   SpO2 98%   BMI 21.48 kg/m²     Physical Exam  Vitals reviewed.   Constitutional:       General: She is not in acute distress.     Appearance: She is well-developed.   Cardiovascular:      Rate and Rhythm: Normal rate and regular rhythm.      Heart sounds: Normal heart sounds. No murmur heard.  Pulmonary:      Effort: Pulmonary effort is normal. No respiratory distress.      Breath sounds: Normal breath sounds. No wheezing or rales.   Abdominal:      Palpations: Abdomen is soft.      Tenderness: There is no abdominal tenderness.   Skin:     General: Skin is warm and dry.      Findings: No rash.   Neurological:      Mental Status: She is alert and oriented to person, place, and time.        Result Review :{ Labs  Result Review  Imaging  Med Tab  Media :23}   The following data was reviewed by: Valentina Ulrich MD on 10/18/2022    Common labs    Common Labs 4/28/22 6/9/22 6/9/22    0507 1019 1019   Glucose   101 (A)   BUN   50 (A)   Creatinine   2.55 (A)   Sodium   138   Potassium   4.3   Chloride   102   Calcium   9.2   Albumin   4.30   Total Bilirubin   0.3   Alkaline Phosphatase   95   AST (SGOT)   20   ALT (SGPT)   12   WBC  9.02    Hemoglobin  11.6 (A)    Hematocrit  34.8    Platelets  272    Hemoglobin A1C 5.50     (A) Abnormal value       Comments are available for some flowsheets but are not being displayed.                   Assessment and Plan {CC Problem List  Visit Diagnosis  ROS  Review (Popup)  Health Maintenance  Quality  BestPractice  Medications  SmartSets  SnapShot Encounters  Media :23}   Diagnoses and all orders for this visit:    1. Renovascular hypertension (Primary)    2. Stage 4 chronic kidney disease (HCC)    3. Osteoporosis without current pathological fracture, unspecified osteoporosis type  -     DEXA Bone Density Axial       Hypertension controlled today  Continue current medication  Last vascular surgery office note " reviewed, patient will have continued monitoring  Stage IV chronic kidney disease, following with nephrology  Has follow-up appointment next month  Encouraged patient to make sure she is drinking plenty of fluid  History of osteoporosis  Patient reports completing 2 treatments of Prolia, then stopped because a family member only took 2 treatments for the same condition and was done so she was not sure she needed additional treatments  Repeat DEXA, then discuss  Talked with patient about continuous use of Prolia after diagnosis of osteoporosis, and that family member could have been using a different medication used to treat this condition  Discussed the risk and benefits of mammogram screening for breast cancer, patient declines at this time      Follow Up {Instructions Charge Capture  Follow-up Communications :23}   Return in about 6 months (around 4/18/2023) for Medicare Wellness, Recheck.  Patient was given instructions and counseling regarding her condition or for health maintenance advice. Please see specific information pulled into the AVS if appropriate.            This document has been electronically signed by Valentina Ulrich MD

## 2022-11-08 ENCOUNTER — TELEPHONE (OUTPATIENT)
Dept: FAMILY MEDICINE CLINIC | Facility: CLINIC | Age: 82
End: 2022-11-08

## 2022-11-08 NOTE — TELEPHONE ENCOUNTER
Osteoporosis on bone exam.  Getting worse.  Please ask patient if she is ok with starting Prolia again.  Thanks, SEBASTIAN Ulrich

## 2022-11-09 NOTE — TELEPHONE ENCOUNTER
Per Dr. Ulrich, Ms. Leon has been called with recent Bone Density results and recommendation    Yes, she is OK with restarting the Prolia Injections.

## 2022-11-15 DIAGNOSIS — M81.0 AGE-RELATED OSTEOPOROSIS WITHOUT CURRENT PATHOLOGICAL FRACTURE: Primary | ICD-10-CM

## 2022-11-28 RX ORDER — ATORVASTATIN CALCIUM 10 MG/1
10 TABLET, FILM COATED ORAL DAILY
Qty: 90 TABLET | Refills: 0 | Status: SHIPPED | OUTPATIENT
Start: 2022-11-28 | End: 2023-03-03 | Stop reason: SDUPTHER

## 2023-01-17 RX ORDER — CLOPIDOGREL BISULFATE 75 MG/1
TABLET ORAL
Qty: 90 TABLET | Refills: 0 | Status: SHIPPED | OUTPATIENT
Start: 2023-01-17

## 2023-03-03 RX ORDER — ATORVASTATIN CALCIUM 10 MG/1
10 TABLET, FILM COATED ORAL DAILY
Qty: 90 TABLET | Refills: 3 | Status: SHIPPED | OUTPATIENT
Start: 2023-03-03

## 2023-04-18 ENCOUNTER — OFFICE VISIT (OUTPATIENT)
Dept: FAMILY MEDICINE CLINIC | Facility: CLINIC | Age: 83
End: 2023-04-18
Payer: MEDICARE

## 2023-04-18 VITALS
WEIGHT: 112 LBS | SYSTOLIC BLOOD PRESSURE: 132 MMHG | HEART RATE: 66 BPM | DIASTOLIC BLOOD PRESSURE: 60 MMHG | BODY MASS INDEX: 21.99 KG/M2 | HEIGHT: 60 IN | OXYGEN SATURATION: 97 %

## 2023-04-18 DIAGNOSIS — I15.0 RENOVASCULAR HYPERTENSION: ICD-10-CM

## 2023-04-18 DIAGNOSIS — Z00.00 MEDICARE ANNUAL WELLNESS VISIT, SUBSEQUENT: Primary | ICD-10-CM

## 2023-04-18 DIAGNOSIS — N18.4 STAGE 4 CHRONIC KIDNEY DISEASE: ICD-10-CM

## 2023-04-18 PROCEDURE — 3075F SYST BP GE 130 - 139MM HG: CPT | Performed by: FAMILY MEDICINE

## 2023-04-18 PROCEDURE — 1170F FXNL STATUS ASSESSED: CPT | Performed by: FAMILY MEDICINE

## 2023-04-18 PROCEDURE — 1159F MED LIST DOCD IN RCRD: CPT | Performed by: FAMILY MEDICINE

## 2023-04-18 PROCEDURE — 3078F DIAST BP <80 MM HG: CPT | Performed by: FAMILY MEDICINE

## 2023-04-18 PROCEDURE — G0439 PPPS, SUBSEQ VISIT: HCPCS | Performed by: FAMILY MEDICINE

## 2023-04-18 PROCEDURE — 1160F RVW MEDS BY RX/DR IN RCRD: CPT | Performed by: FAMILY MEDICINE

## 2023-04-18 NOTE — PROGRESS NOTES
The ABCs of the Annual Wellness Visit  Subsequent Medicare Wellness Visit    Subjective      Ilad Leon is a 83 y.o. female who presents for a Subsequent Medicare Wellness Visit.    The following portions of the patient's history were reviewed and   updated as appropriate: allergies, current medications, past family history, past medical history, past social history, past surgical history and problem list.    Compared to one year ago, the patient feels her physical   health is better.    Compared to one year ago, the patient feels her mental   health is the same.   passed away in Oct. 2022, so this has caused intermittent mood changes.      Recent Hospitalizations:  This patient has had a [facility] admission record on file within the last 365 days.    Current Medical Providers:  Patient Care Team:  Valentina Ulrich MD as PCP - General (Family Medicine)  Marcus Mesa MD as Consulting Physician (Nephrology)  Hamman, Jack L, MD as Surgeon (Cardiothoracic Surgery)  Jena Marin MA as Medical Assistant (Family Medicine)  Ho Glez OD as Consulting Physician (Optometry)  Catalino Albarado MD as Surgeon (Cardiothoracic Surgery)    Outpatient Medications Prior to Visit   Medication Sig Dispense Refill   • acetaminophen (TYLENOL) 325 MG tablet Take 2 tablets by mouth Every 6 (Six) Hours As Needed for Mild Pain.     • amLODIPine (NORVASC) 10 MG tablet Take 1 tablet by mouth Daily. 90 tablet 1   • atorvastatin (LIPITOR) 10 MG tablet Take 1 tablet by mouth Daily. 90 tablet 3   • clopidogrel (PLAVIX) 75 MG tablet TAKE 1 TABLET BY MOUTH DAILY 90 tablet 0   • furosemide (LASIX) 20 MG tablet Take 0.5 tablets by mouth Daily.     • loratadine (CLARITIN) 10 MG tablet TAKE 1 TABLET BY MOUTH DAILY. 10 tablet 2   • metoprolol tartrate (LOPRESSOR) 50 MG tablet Take 1 tablet by mouth 2 (Two) Times a Day. 30 tablet 2   • Nutritional Supplements (Suplena/Carb Steady) liquid Take  by mouth.     •  "vitamin B-12 (CYANOCOBALAMIN) 1000 MCG tablet Take 1 tablet by mouth Daily.       No facility-administered medications prior to visit.       No opioid medication identified on active medication list. I have reviewed chart for other potential  high risk medication/s and harmful drug interactions in the elderly.          Aspirin is not on active medication list.  Aspirin use is not indicated based on review of current medical condition/s. Risk of harm outweighs potential benefits.      Patient Active Problem List   Diagnosis   • Adjustment disorder with anxious mood   • Anemia   • Artificial lens present   • Atopic conjunctivitis   • CKD (chronic kidney disease) stage 4, GFR 15-29 ml/min   • Cortical senile cataract   • Degenerative joint disease involving multiple joints   • Hyperglycemia   • Hyperlipidemia   • Malignant neoplasm of female breast   • Need for prophylactic vaccination against Streptococcus pneumoniae (pneumococcus)   • Nuclear cataract   • Osteoporosis declined fosamax   • Peripheral vascular disease   • Renal artery stenosis   • Renovascular hypertension   • Restless legs   • History of screening mammography   • Stricture esophagus   • Trigeminal neuralgia   • Vitamin D deficiency   • Acute right-sided thoracic back pain   • Chronic pain of both ankles   • Pain in both feet   • Osteoporosis   • Osteoporosis   • Carotid stenosis, asymptomatic, left   • Hypertension   • Hypertensive urgency     Advance Care Planning   Advance Care Planning     Advance Directive is on file.  ACP discussion was held with the patient during this visit. Patient has an advance directive in EMR which is still valid.      Objective    Vitals:    04/18/23 0857   BP: 132/60   Pulse: 66   SpO2: 97%   Weight: 50.8 kg (112 lb)   Height: 152.4 cm (60\")   PainSc: 0-No pain     Estimated body mass index is 21.87 kg/m² as calculated from the following:    Height as of this encounter: 152.4 cm (60\").    Weight as of this encounter: " 50.8 kg (112 lb).    BMI is within normal parameters. No other follow-up for BMI required.    Does the patient have evidence of cognitive impairment?   No            HEALTH RISK ASSESSMENT    Smoking Status:  Social History     Tobacco Use   Smoking Status Never   Smokeless Tobacco Never     Alcohol Consumption:  Social History     Substance and Sexual Activity   Alcohol Use No     Fall Risk Screen:    LILYADI Fall Risk Assessment was completed, and patient is at LOW risk for falls.Assessment completed on:2023    Depression Screenin/18/2023     9:06 AM   PHQ-2/PHQ-9 Depression Screening   Little Interest or Pleasure in Doing Things 0-->not at all   Feeling Down, Depressed or Hopeless 0-->not at all   PHQ-9: Brief Depression Severity Measure Score 0       Health Habits and Functional and Cognitive Screenin/18/2023     9:00 AM   Functional & Cognitive Status   Do you have difficulty preparing food and eating? No   Do you have difficulty bathing yourself, getting dressed or grooming yourself? No   Do you have difficulty using the toilet? No   Do you have difficulty moving around from place to place? No   Do you have trouble with steps or getting out of a bed or a chair? No   Current Diet Well Balanced Diet   Dental Exam Up to date   Eye Exam Up to date   Exercise (times per week) 5 times per week   Current Exercises Include House Cleaning   Do you need help using the phone?  No   Are you deaf or do you have serious difficulty hearing?  No   Do you need help with transportation? No   Do you need help shopping? Yes   Do you need help preparing meals?  No   Do you need help with housework?  No   Do you need help with laundry? No   Do you need help taking your medications? No   Do you need help managing money? No   Do you ever drive or ride in a car without wearing a seat belt? No   Have you felt unusual stress, anger or loneliness in the last month? Yes   Who do you live with? Alone   If you need  help, do you have trouble finding someone available to you? No   Have you been bothered in the last four weeks by sexual problems? No   Do you have difficulty concentrating, remembering or making decisions? No       Age-appropriate Screening Schedule:  Refer to the list below for future screening recommendations based on patient's age, sex and/or medical conditions. Orders for these recommended tests are listed in the plan section. The patient has been provided with a written plan.    Health Maintenance   Topic Date Due   • ZOSTER VACCINE (3 of 3) 01/31/2017   • ANNUAL WELLNESS VISIT  08/27/2021   • COVID-19 Vaccine (3 - Booster for Genesis series) 01/11/2022   • LIPID PANEL  10/25/2022   • MAMMOGRAM  10/18/2023 (Originally 9/10/2021)   • INFLUENZA VACCINE  08/01/2023   • TDAP/TD VACCINES (2 - Td or Tdap) 07/08/2024   • DXA SCAN  10/24/2024   • Pneumococcal Vaccine 65+  Completed                  CMS Preventative Services Quick Reference  Risk Factors Identified During Encounter:    None Identified    The above risks/problems have been discussed with the patient.  Pertinent information has been shared with the patient in the After Visit Summary.    Diagnoses and all orders for this visit:    1. Medicare annual wellness visit, subsequent (Primary)    2. Renovascular hypertension    3. Stage 4 chronic kidney disease      Medicare wellness visit completed today  Hypertension controlled, continue current medication  New blood pressure monitoring sheets provided for home monitoring  Continue following with nephrology for chronic kidney disease      Follow Up:   Next Medicare Wellness visit to be scheduled in 1 year.      An After Visit Summary and PPPS were made available to the patient.            This document has been electronically signed by Valentina Ulrich MD

## 2023-04-21 RX ORDER — CLOPIDOGREL BISULFATE 75 MG/1
75 TABLET ORAL DAILY
Qty: 90 TABLET | Refills: 3 | Status: SHIPPED | OUTPATIENT
Start: 2023-04-21

## 2023-08-14 ENCOUNTER — OFFICE VISIT (OUTPATIENT)
Dept: CARDIAC SURGERY | Facility: CLINIC | Age: 83
End: 2023-08-14
Payer: MEDICARE

## 2023-08-14 VITALS
OXYGEN SATURATION: 98 % | DIASTOLIC BLOOD PRESSURE: 72 MMHG | HEART RATE: 68 BPM | BODY MASS INDEX: 22.58 KG/M2 | SYSTOLIC BLOOD PRESSURE: 144 MMHG | WEIGHT: 115 LBS | HEIGHT: 60 IN

## 2023-08-14 DIAGNOSIS — I70.1 RENAL ARTERY STENOSIS: ICD-10-CM

## 2023-08-14 DIAGNOSIS — I10 PRIMARY HYPERTENSION: ICD-10-CM

## 2023-08-14 DIAGNOSIS — N18.4 CKD (CHRONIC KIDNEY DISEASE) STAGE 4, GFR 15-29 ML/MIN: ICD-10-CM

## 2023-08-14 DIAGNOSIS — I65.22 CAROTID STENOSIS, ASYMPTOMATIC, LEFT: Primary | ICD-10-CM

## 2023-08-14 NOTE — PROGRESS NOTES
Ilda Leon  1940    Chief Complaint:    Chief Complaint   Patient presents with    Carotid Artery Disease    Renal Artery Stenosis       HPI:      PCP:  Valentina Ulrich MD  Nephrology:  Dr Velazquez     83y.o. female with HTN(stable, increased risk stroke, rupture), Hyperlipidemia(stable, increased risk cardiovascular events) and Chronic Kidney Disease(stable, increased risk renal failure) , renal artery stenosis(stable, increase risk cardiovascular events), carotid stenosis(stable, increase risk stroke).  never smoked.  nearing need for dialysis. Asymptomatic carotid stenosis.  Atrophic LEFT kidney..  No other associated signs, symptoms or modifying factors.     7/2020 Carotid Duplex:  RUIZ 0-49% (100/25cm/s, ratio 1.8), LICA 50-69% (165/33cm/s, ratio 2.8) antegrade verts.  8/2021 Carotid Duplex:  RUIZ 0-49% (81/23cm/s, ratio 1.3), LICA 50-69% (153/23cm/s, ratio 2.5) antegrade verts.  8/2022 Carotid Duplex:  RUIZ 0-49% (83/20cm/s, ratio 1.3), LICA 50-69% (150/23cm/s, ratio 2.1) antegrade verts.  8/2023: Carotid Duplex: RIGHT 0-49% (103/21cm/s, ratio 2.2) LEFT 50-69% (150/36cm/s, ratio 3.1) Antegrade      5/2006 RIGHT renal artery stent  12/2016 Renal Artery Duplex:  RIGHT <60% (90/20cm/s, RAR .70), LEFT atrophic.  7/2018 Renal Artery Duplex:  RIGHT <60% (130/30cm/s, RAR .90), LEFT atrophic.  7/2019 Renal Artery Duplex:  RIGHT <60% (146/35cm/s, RAR 1.3), LEFT atrophic.  7/2020 Renal Artery Duplex:  RIGHT <60% (124/27cm/s, RAR .85) patent stent, LEFT atrophic.  8/2021 Renal Artery Duplex:  RIGHT <60% (92/22cm/s, RAR .68) patent stent, LEFT atrophic.  8/2022 Renal Artery Duplex:  RIGHT <60% (104/27cm/s, RAR 1.1) patent stent, LEFT atrophic.  8/2023: Renal Artery Duplex: RIGHT <60% (125/33cm/s).patent stent, LEFT atrophic.    The following portions of the patient's history were reviewed and updated as appropriate: allergies, current medications, past family history, past medical history, past social  history, past surgical history and problem list.  Recent images independently reviewed.  Available laboratory values reviewed.    PMH:  Past Medical History:   Diagnosis Date    Aching leg syndrome     Acute bronchitis     Acute serous otitis media     Acute sinusitis     Adjustment disorder with anxious mood     Adverse reaction to drug     Allergic rhinitis     Anemia     Artificial lens present     Artificial lens in position.    Atopic conjunctivitis     Breast cyst     Chronic kidney disease, stage 3     in the right left non-functional    Cortical senile cataract     Degenerative joint disease involving multiple joints     Disorder of muscle     statin-induced       Dyslipidemia     Family history of rheumatic fever 1947    Hearing loss of left ear     History of blood clots     in left renal artery resulting in loss of left kidney    History of mammography, screening 08/06/2015    History of radiation therapy 1991    History of shingles 2011    Hx of radiation therapy     Hyperglycemia     Hyperlipidemia     Hypertension     Malignant neoplasm of female breast     history of with mastectomy       Nausea     Nausea and vomiting     Need for prophylactic vaccination against Streptococcus pneumoniae (pneumococcus)     Need for prophylactic vaccination or inoculation against diphtheria-tetanus-pertussis, combined [DTP] [DTaP]     Non-alcoholic fatty liver disease     Nuclear cataract     Osteoporosis     Pain in back, lumbar region     Peripheral vascular disease     Renal artery stenosis     Renovascular hypertension     Restless legs     Screening for osteoporosis     Stricture of esophagus     Trigeminal neuralgia     Upper respiratory infection     Vitamin D deficiency      Past Surgical History:   Procedure Laterality Date    BREAST BIOPSY      BREAST LUMPECTOMY Right 1991    CATARACT EXTRACTION WITH INTRAOCULAR LENS IMPLANT Right 2008    CHOLECYSTECTOMY  03/04/2008    Laparoscopic cholecystectomy with  operative cholangiogram. Acute cholecystitis    COLONOSCOPY  2006    INJECTION OF MEDICATION  10/03/2011    Kenalog (1)     MAMMO BILATERAL  02/28/2015    Screening    RENAL ARTERY STENT Right 05/18/2006    5x15mm balloon expandable stent x2      Family History   Problem Relation Age of Onset    Heart disease Other     Hypertension Other     Diabetes Mother     Heart disease Mother     Hyperlipidemia Mother     Hypertension Mother     Stroke Father     Cancer Brother         prostate    Cancer Sister 60        lung    Breast cancer Sister      Social History     Tobacco Use    Smoking status: Never     Passive exposure: Never    Smokeless tobacco: Never   Vaping Use    Vaping Use: Never used   Substance Use Topics    Alcohol use: No    Drug use: No       ALLERGIES:  Allergies   Allergen Reactions    Carbamazepine Unknown - High Severity    Other Unknown - High Severity     Statins-Hmg-Coa Reductase Inhibitors;      Sulfa Antibiotics Nausea And Vomiting    Zithromax [Azithromycin] Nausea And Vomiting    Allegra [Fexofenadine] Unknown - Low Severity     Unknown Reaction    Trileptal [Oxcarbazepine] Unknown - High Severity         MEDICATIONS:    Current Outpatient Medications:     acetaminophen (TYLENOL) 325 MG tablet, Take 2 tablets by mouth Every 6 (Six) Hours As Needed for Mild Pain., Disp: , Rfl:     amLODIPine (NORVASC) 10 MG tablet, Take 1 tablet by mouth Daily., Disp: 90 tablet, Rfl: 1    atorvastatin (LIPITOR) 10 MG tablet, Take 1 tablet by mouth Daily., Disp: 90 tablet, Rfl: 3    clopidogrel (PLAVIX) 75 MG tablet, Take 1 tablet by mouth Daily., Disp: 90 tablet, Rfl: 3    furosemide (LASIX) 20 MG tablet, Take 0.5 tablets by mouth Daily., Disp: , Rfl:     metoprolol tartrate (LOPRESSOR) 50 MG tablet, Take 1 tablet by mouth 2 (Two) Times a Day., Disp: 30 tablet, Rfl: 2    Nutritional Supplements (Suplena/Carb Steady) liquid, Take  by mouth., Disp: , Rfl:     vitamin B-12 (CYANOCOBALAMIN) 1000 MCG tablet, Take 1  "tablet by mouth Daily., Disp: , Rfl:     Review of Systems   Review of Systems   Constitutional: Negative for malaise/fatigue and weight loss.   Cardiovascular:  Negative for chest pain, claudication and dyspnea on exertion.   Respiratory:  Negative for cough and shortness of breath.    Skin:  Negative for color change and poor wound healing.   Neurological:  Positive for loss of balance. Negative for dizziness, numbness and weakness.   All other systems reviewed and are negative.    Physical Exam   Vitals:    08/14/23 0911   BP: 144/72   BP Location: Left arm   Pulse: 68   SpO2: 98%   Weight: 52.2 kg (115 lb)   Height: 152.4 cm (60\")     Body surface area is 1.48 meters squared.  Body mass index is 22.46 kg/mý.  Physical Exam  Constitutional:       General: She is not in acute distress.     Appearance: She is not ill-appearing.   HENT:      Right Ear: Hearing normal.      Left Ear: Hearing normal.      Nose: No nasal deformity.      Mouth/Throat:      Dentition: Normal dentition. Does not have dentures.   Cardiovascular:      Rate and Rhythm: Normal rate and regular rhythm.      Pulses:           Carotid pulses are 2+ on the right side and 2+ on the left side.       Radial pulses are 2+ on the right side and 2+ on the left side.        Dorsalis pedis pulses are 2+ on the right side and 2+ on the left side.        Posterior tibial pulses are 1+ on the right side and 1+ on the left side.      Heart sounds: No murmur heard.  Pulmonary:      Effort: Pulmonary effort is normal.      Breath sounds: Normal breath sounds.   Abdominal:      General: There is no distension.      Palpations: Abdomen is soft. There is no mass.      Tenderness: There is no abdominal tenderness.   Musculoskeletal:         General: No deformity.   Skin:     General: Skin is warm and dry.      Capillary Refill: Capillary refill takes less than 2 seconds.      Coloration: Skin is not pale.      Findings: No erythema.      Comments: No venous " staining   Neurological:      General: No focal deficit present.      Mental Status: She is alert and oriented to person, place, and time.      Gait: Gait normal.   Psychiatric:         Speech: Speech normal.         Behavior: Behavior is cooperative.         Thought Content: Thought content normal.         Judgment: Judgment normal.       BUN   Date Value Ref Range Status   06/09/2022 50 (H) 8 - 23 mg/dL Final     Creatinine   Date Value Ref Range Status   06/09/2022 2.55 (H) 0.57 - 1.00 mg/dL Final     eGFR Non  Amer   Date Value Ref Range Status   12/02/2019 38 (L) >60 mL/min/1.73 Final       ASSESSMENT/PLAN  Detailed discussion regarding risks, benefits, and treatment plan. Images independently reviewed. Patient understands, agrees, and wishes to proceed with plan.      1. Carotid stenosis, asymptomatic, left  moderate Carotid Artery Stenosis left remained stable Asymptomatic   Medical Management: PLAVIX,STATIN   If you should experience any neurological symptoms including but not limited to visual or speech disturbances confusion, seizures, or weakness of limbs of one side of your body notify Heart and Vascular center immediately for evaluation or if after hours present to the nearest Emergency Department.    Return 1 yr   - Duplex Carotid Ultrasound CAR; Future    2. Renal artery stenosis  right Renal Artery stenosis-stable  Patent STent  Renal function stable  Blood Pressure stable on 2 medications  Follow up 1yr-Duplex   - Duplex Renal Artery - Bilateral Complete CAR; Future    3. CKD (chronic kidney disease) stage 4, GFR 15-29 ml/min  Lab Results   Component Value Date    CREATININE 2.55 (H) 06/09/2022        4. Primary hypertension  CONTROLLED  Follows Dr. Velazquez    Advance Care Planning discussed and  Informational packet given to patient. Advance Care Plan on file: Yes    Time spent 30 minutes in face to face evaluation, reviewing of medical history, tests, and procedures. Independent  interpretation of vascular studies, ordering additional tests, documentation, and coordination of care.   Counseling and education with the patient and family regarding treatment options, plan of care, and hoped for outcomes. All questions answered.           This document has been electronically signed by JAMARCUS Funk on August 14, 2023 11:33 CDT

## 2023-08-14 NOTE — PATIENT INSTRUCTIONS
right Renal Artery stenosis-stable  Patent STent  Renal function stable  Blood Pressure stable on 2 medications  Follow up 1yr-Duplex     moderate Carotid Artery Stenosis left remained stable Asymptomatic   Medical Management: PLAVIX,STATIN   If you should experience any neurological symptoms including but not limited to visual or speech disturbances confusion, seizures, or weakness of limbs of one side of your body notify Heart and Vascular center immediately for evaluation or if after hours present to the nearest Emergency Department.    Return 1 yr

## 2023-09-18 ENCOUNTER — TELEPHONE (OUTPATIENT)
Dept: ONCOLOGY | Facility: HOSPITAL | Age: 83
End: 2023-09-18
Payer: MEDICARE